# Patient Record
Sex: FEMALE | Race: WHITE | Employment: OTHER | URBAN - METROPOLITAN AREA
[De-identification: names, ages, dates, MRNs, and addresses within clinical notes are randomized per-mention and may not be internally consistent; named-entity substitution may affect disease eponyms.]

---

## 2017-05-24 DIAGNOSIS — Z98.84 STATUS POST GASTRIC BANDING: ICD-10-CM

## 2017-05-24 DIAGNOSIS — Z98.84 STATUS POST GASTRIC BYPASS FOR OBESITY: ICD-10-CM

## 2017-05-24 DIAGNOSIS — K90.89 OTHER SPECIFIED INTESTINAL MALABSORPTION: Primary | ICD-10-CM

## 2017-06-28 ENCOUNTER — OFFICE VISIT (OUTPATIENT)
Dept: SURGERY | Age: 67
End: 2017-06-28

## 2017-06-28 ENCOUNTER — HOSPITAL ENCOUNTER (OUTPATIENT)
Dept: LAB | Age: 67
Discharge: HOME | End: 2017-06-28
Payer: MEDICARE

## 2017-06-28 VITALS
BODY MASS INDEX: 32.89 KG/M2 | RESPIRATION RATE: 16 BRPM | HEART RATE: 78 BPM | OXYGEN SATURATION: 100 % | HEIGHT: 68 IN | WEIGHT: 217 LBS | SYSTOLIC BLOOD PRESSURE: 95 MMHG | DIASTOLIC BLOOD PRESSURE: 62 MMHG

## 2017-06-28 DIAGNOSIS — K90.89 OTHER SPECIFIED INTESTINAL MALABSORPTION: Primary | ICD-10-CM

## 2017-06-28 DIAGNOSIS — Z98.84 STATUS POST GASTRIC BYPASS FOR OBESITY: ICD-10-CM

## 2017-06-28 DIAGNOSIS — K90.89 OTHER SPECIFIED INTESTINAL MALABSORPTION: ICD-10-CM

## 2017-06-28 DIAGNOSIS — Z98.84 STATUS POST GASTRIC BANDING: ICD-10-CM

## 2017-06-28 LAB
ALBUMIN SERPL BCP-MCNC: 3.4 G/DL (ref 3.4–5)
ALBUMIN/GLOB SERPL: 1.2 {RATIO} (ref 0.8–1.7)
ALP SERPL-CCNC: 63 U/L (ref 45–117)
ALT SERPL-CCNC: 13 U/L (ref 13–56)
ANION GAP BLD CALC-SCNC: 9 MMOL/L (ref 3–18)
AST SERPL W P-5'-P-CCNC: 13 U/L (ref 15–37)
BASOPHILS # BLD AUTO: 0.1 K/UL (ref 0–0.06)
BASOPHILS # BLD: 1 % (ref 0–2)
BILIRUB SERPL-MCNC: 0.4 MG/DL (ref 0.2–1)
BUN SERPL-MCNC: 25 MG/DL (ref 7–18)
BUN/CREAT SERPL: 18 (ref 12–20)
CALCIUM SERPL-MCNC: 9.4 MG/DL (ref 8.5–10.1)
CHLORIDE SERPL-SCNC: 106 MMOL/L (ref 100–108)
CO2 SERPL-SCNC: 29 MMOL/L (ref 21–32)
CREAT SERPL-MCNC: 1.4 MG/DL (ref 0.6–1.3)
DIFFERENTIAL METHOD BLD: ABNORMAL
EOSINOPHIL # BLD: 0.4 K/UL (ref 0–0.4)
EOSINOPHIL NFR BLD: 6 % (ref 0–5)
ERYTHROCYTE [DISTWIDTH] IN BLOOD BY AUTOMATED COUNT: 13.5 % (ref 11.6–14.5)
FERRITIN SERPL-MCNC: 305 NG/ML (ref 8–388)
FOLATE SERPL-MCNC: >20 NG/ML (ref 3.1–17.5)
GLOBULIN SER CALC-MCNC: 2.8 G/DL (ref 2–4)
GLUCOSE SERPL-MCNC: 107 MG/DL (ref 74–99)
HCT VFR BLD AUTO: 34.6 % (ref 35–45)
HGB BLD-MCNC: 11.5 G/DL (ref 12–16)
IRON SERPL-MCNC: 107 UG/DL (ref 50–175)
LYMPHOCYTES # BLD AUTO: 27 % (ref 21–52)
LYMPHOCYTES # BLD: 1.8 K/UL (ref 0.9–3.6)
MCH RBC QN AUTO: 31.2 PG (ref 24–34)
MCHC RBC AUTO-ENTMCNC: 33.2 G/DL (ref 31–37)
MCV RBC AUTO: 93.8 FL (ref 74–97)
MONOCYTES # BLD: 0.5 K/UL (ref 0.05–1.2)
MONOCYTES NFR BLD AUTO: 8 % (ref 3–10)
NEUTS SEG # BLD: 3.7 K/UL (ref 1.8–8)
NEUTS SEG NFR BLD AUTO: 58 % (ref 40–73)
PLATELET # BLD AUTO: 255 K/UL (ref 135–420)
PMV BLD AUTO: 10.5 FL (ref 9.2–11.8)
POTASSIUM SERPL-SCNC: 3.8 MMOL/L (ref 3.5–5.5)
PROT SERPL-MCNC: 6.2 G/DL (ref 6.4–8.2)
RBC # BLD AUTO: 3.69 M/UL (ref 4.2–5.3)
SODIUM SERPL-SCNC: 144 MMOL/L (ref 136–145)
VIT B12 SERPL-MCNC: 375 PG/ML (ref 211–911)
WBC # BLD AUTO: 6.4 K/UL (ref 4.6–13.2)

## 2017-06-28 PROCEDURE — 82746 ASSAY OF FOLIC ACID SERUM: CPT | Performed by: SPECIALIST

## 2017-06-28 PROCEDURE — 85025 COMPLETE CBC W/AUTO DIFF WBC: CPT | Performed by: SPECIALIST

## 2017-06-28 PROCEDURE — 82607 VITAMIN B-12: CPT | Performed by: SPECIALIST

## 2017-06-28 PROCEDURE — 83540 ASSAY OF IRON: CPT | Performed by: SPECIALIST

## 2017-06-28 PROCEDURE — 80053 COMPREHEN METABOLIC PANEL: CPT | Performed by: SPECIALIST

## 2017-06-28 PROCEDURE — 36415 COLL VENOUS BLD VENIPUNCTURE: CPT | Performed by: SPECIALIST

## 2017-06-28 PROCEDURE — 84425 ASSAY OF VITAMIN B-1: CPT | Performed by: SPECIALIST

## 2017-06-28 PROCEDURE — 82728 ASSAY OF FERRITIN: CPT | Performed by: SPECIALIST

## 2017-06-28 NOTE — PATIENT INSTRUCTIONS
Body Mass Index: Care Instructions  Your Care Instructions    Body mass index (BMI) can help you see if your weight is raising your risk for health problems. It uses a formula to compare how much you weigh with how tall you are. · A BMI lower than 18.5 is considered underweight. · A BMI between 18.5 and 24.9 is considered healthy. · A BMI between 25 and 29.9 is considered overweight. A BMI of 30 or higher is considered obese. If your BMI is in the normal range, it means that you have a lower risk for weight-related health problems. If your BMI is in the overweight or obese range, you may be at increased risk for weight-related health problems, such as high blood pressure, heart disease, stroke, arthritis or joint pain, and diabetes. If your BMI is in the underweight range, you may be at increased risk for health problems such as fatigue, lower protection (immunity) against illness, muscle loss, bone loss, hair loss, and hormone problems. BMI is just one measure of your risk for weight-related health problems. You may be at higher risk for health problems if you are not active, you eat an unhealthy diet, or you drink too much alcohol or use tobacco products. Follow-up care is a key part of your treatment and safety. Be sure to make and go to all appointments, and call your doctor if you are having problems. It's also a good idea to know your test results and keep a list of the medicines you take. How can you care for yourself at home? · Practice healthy eating habits. This includes eating plenty of fruits, vegetables, whole grains, lean protein, and low-fat dairy. · If your doctor recommends it, get more exercise. Walking is a good choice. Bit by bit, increase the amount you walk every day. Try for at least 30 minutes on most days of the week. · Do not smoke. Smoking can increase your risk for health problems. If you need help quitting, talk to your doctor about stop-smoking programs and medicines. These can increase your chances of quitting for good. · Limit alcohol to 2 drinks a day for men and 1 drink a day for women. Too much alcohol can cause health problems. If you have a BMI higher than 25  · Your doctor may do other tests to check your risk for weight-related health problems. This may include measuring the distance around your waist. A waist measurement of more than 40 inches in men or 35 inches in women can increase the risk of weight-related health problems. · Talk with your doctor about steps you can take to stay healthy or improve your health. You may need to make lifestyle changes to lose weight and stay healthy, such as changing your diet and getting regular exercise. If you have a BMI lower than 18.5  · Your doctor may do other tests to check your risk for health problems. · Talk with your doctor about steps you can take to stay healthy or improve your health. You may need to make lifestyle changes to gain or maintain weight and stay healthy, such as getting more healthy foods in your diet and doing exercises to build muscle. Where can you learn more? Go to http://pamela-camden.info/. Enter S176 in the search box to learn more about \"Body Mass Index: Care Instructions. \"  Current as of: January 23, 2017  Content Version: 11.3  © 7730-9320 [employer]. Care instructions adapted under license by [employer] (which disclaims liability or warranty for this information). If you have questions about a medical condition or this instruction, always ask your healthcare professional. [place] [address] any warranty or liability for your use of this information. Patient Instructions      1. Continue to monitor carbohydrate and protein intake- remember to keep your           total  carbohydrates to 50 grams or less per day for best results.   2. Remember hydration goals - usually 48 to 64 ounces of liquids per day  3. Continue to work towards exercise goals - minimum 3 days per week of 45          minutes to  1 hour at a time. 4. Remember to take vitamins as directed        Supplement Resource Guide    Importance of Protein:   Maintains lean body mass, produces antibodies to fight off infections, heals wounds, minimizes hair loss, helps to give you energy, helps with satiety, and keeping you full between meals. Importance of Calcium:  Needed for healthy bones and teeth, normal blood clotting, and nervous system functioning, higher risk of osteoporosis and bone disease with non-compliance. Importance of Multivitamins: Many functions. Supply you with extra nutrients that you may be missing from food. May lead to iron deficiency anemia, weakness, fatigue, and many other symptoms with non-compliance. Importance of B Vitamins:  Important for red blood cell formation, metabolism, energy, and helps to maintain a healthy nervous system. Protein Supplement  Find one you like now. Use immediately after surgery. Look for:  35-50g protein each day from your protein supplement once you reach the progression diet. 0-3 g fat per serving  0-3 g sugar per serving    Protein drinks should be split in separate dosages. Recommend: Lifelong  1 year + Calcium Supplement:     Start taking within a month after surgery. Look for: Calcium Citrate Plus D (1500 mg per day)  Recommend: Citracal     .          Avoid chocolate chewable calcium. Can use chewable bariatric or [employer] brand or similar chewable. The body cannot absorb more than 500-600 mg @ a time. Take for Life Multi-vitamin Supplement:      1st Month After Surgery: Any complete chewable, such as: [place]s Complete chewables. Avoid [place] sours or gummies. They lack iron and other important nutrients and also have added sugar.     Continue with chewable vitamin or change to adult complete multivitamin one month after surgery. Menstruating women can take a prenatal vitamin. Make sure has at least 18 mg iron and 290-090 mcg folic acid):    Vitamin B12, B Complex Vitamin, and Biotin  Start taking within a month after surgery. Vitamin B12:  1000 mcg of Vitamin B12 three times weekly    Must take sublingually (meaning you take it under your tongue) or in a liquid drop form for easy absorption. B Complex Vitamin: Take a pill or liquid drop form once daily. Biotin: This vitamin can help prevent hair loss.     Recommend 5mg   (5000 mcg) a day  Biotin is Optional

## 2017-06-30 LAB — VIT B1 BLD-SCNC: 119.2 NMOL/L (ref 66.5–200)

## 2017-08-23 ENCOUNTER — APPOINTMENT (OUTPATIENT)
Dept: GENERAL RADIOLOGY | Age: 67
End: 2017-08-23
Attending: SPECIALIST
Payer: MEDICARE

## 2017-08-23 ENCOUNTER — HOSPITAL ENCOUNTER (OUTPATIENT)
Age: 67
Setting detail: OUTPATIENT SURGERY
Discharge: HOME | End: 2017-08-23
Attending: SPECIALIST | Admitting: SPECIALIST
Payer: MEDICARE

## 2017-08-23 VITALS
SYSTOLIC BLOOD PRESSURE: 144 MMHG | RESPIRATION RATE: 16 BRPM | WEIGHT: 207.3 LBS | HEIGHT: 68 IN | DIASTOLIC BLOOD PRESSURE: 88 MMHG | TEMPERATURE: 95.6 F | OXYGEN SATURATION: 99 % | BODY MASS INDEX: 31.42 KG/M2 | HEART RATE: 102 BPM

## 2017-08-23 DIAGNOSIS — Z46.51 FITTING AND ADJUSTMENT OF GASTRIC LAP BAND: ICD-10-CM

## 2017-08-23 PROCEDURE — 74011000250 HC RX REV CODE- 250: Performed by: SPECIALIST

## 2017-08-23 PROCEDURE — 76000 FLUOROSCOPY <1 HR PHYS/QHP: CPT

## 2017-08-23 PROCEDURE — 74011000255 HC RX REV CODE- 255: Performed by: SPECIALIST

## 2017-08-23 PROCEDURE — 43999 UNLISTED PROCEDURE STOMACH: CPT | Performed by: SPECIALIST

## 2017-08-23 RX ORDER — LIDOCAINE HYDROCHLORIDE 10 MG/ML
INJECTION INFILTRATION; PERINEURAL AS NEEDED
Status: DISCONTINUED | OUTPATIENT
Start: 2017-08-23 | End: 2017-08-23 | Stop reason: HOSPADM

## 2017-08-23 NOTE — DISCHARGE INSTRUCTIONS
Raza Jarvis  [id number] [address] [facility] [address] [facility], [address] [place] [address] - [phone] Cristóbal Ramos, Registered Dietician;  [phone]    To schedule your next adjustment, call Ivon Ham @ [phone]    Post Adjustable Gastric Band Fill Instructions    Your band may now be fairly tight and some swelling may occur at the band site following a fill. Therefore, you should:   Stay on liquids for 2 days   Then on soft foods for 2 days   Then resume regular foods    All meals should fit into a 4 oz. (1/2 cup) container. Eating Tips:   Use a small plate   Put your fork down between bites   Eat slowly   Do not eat and drink at the same time. Tips for Weight Loss:   Exercise and protein will  help increase and maintain and build muscle mass   Exercise at least 30-40 min. each day. Include cardiovascular and resistance training.  Drink at least 8 glasses of water every day.  Take you multi-vitamins and B vitamins every day.  Journal your food   Keep carbohydrates less than 50Gm per day. Call for a band adjustment if:   You are losing less than 1 lb per week   You are having increasing hunger between meals    Call for evaluation if you develop reflux or inability to tolerate solid foods. Your band may be too tight. Make sure you have a follow up appointment.         Support Group Meetings  [facility]  2nd Thursday every month  6:00pm

## 2017-08-23 NOTE — IP AVS SNAPSHOT
Marcela Ramirez 
 
 
 [address] [place] [id number] 
[phone] Patient: John Epperson MRN: [id number] JOHN:[birth year] You are allergic to the following Allergen Reactions Ciprofibrate Anaphylaxis Doxycycline Anaphylaxis Flagyl (Metronidazole) Anaphylaxis Levaquin (Levofloxacin) Anaphylaxis Sulfa (Sulfonamide Antibiotics) Anaphylaxis Rash Codeine Nausea and Vomiting Pcn (Penicillins) Unknown (comments) Pt denies this allergy Versed (Midazolam) Nausea and Vomiting Recent Documentation Height Weight BMI OB Status Smoking Status 1.727 m 94 kg 31.52 kg/m2 Hysterectomy Former Smoker Emergency Contacts Name Discharge Info Relation Home Work Mobile Leonora Welsh  Spouse [3] [phone] Robert Ingram [3] [phone] About your hospitalization You were admitted on:  August 23, 2017 You last received care in the:  [facility] ENDOSCOPY You were discharged on:  August 23, 2017 Unit phone number:  [phone] Why you were hospitalized Your primary diagnosis was:  Not on File Providers Seen During Your Hospitalizations Provider Role Specialty Primary office phone Mariya Marin MD Attending Provider General Surgery [phone] Your Primary Care Physician (PCP) Primary Care Physician Office Phone Office Fax Bulmaro Hughes [phone] Follow-up Information None Current Discharge Medication List  
  
ASK your doctor about these medications Dose & Instructions Dispensing Information Comments Morning Noon Evening Bedtime AMBIEN 10 mg tablet Generic drug:  zolpidem Your last dose was: Your next dose is:    
   
   
 Dose:  10 mg Take 10 mg by mouth nightly. Refills:  0  
     
   
   
   
  
 ATIVAN 0.5 mg tablet Generic drug:  LORazepam  
   
Your last dose was: Your next dose is:    
   
   
 Dose:  0.5 mg Take 0.5 mg by mouth every four (4) hours as needed. Refills:  0  
     
   
   
   
  
 busPIRone 10 mg tablet Commonly known as:  BUSPAR Your last dose was: Your next dose is:    
   
   
 Dose:  5 mg Take 5 mg by mouth daily. Refills:  0  
     
   
   
   
  
 cholecalciferol (VITAMIN D3) 5,000 unit Tab tablet Commonly known as:  VITAMIN D3 Your last dose was: Your next dose is:    
   
   
 Dose:  5000 Units Take 5,000 Units by mouth nightly. Refills:  0 COREG 12.5 mg tablet Generic drug:  carvedilol Your last dose was: Your next dose is:    
   
   
 Dose:  12.5 mg Take 12.5 mg by mouth two (2) times daily (with meals). Refills:  0  
     
   
   
   
  
 cranberry extract 450 mg Tab tablet Your last dose was: Your next dose is: Take  by mouth. Refills:  0  
     
   
   
   
  
 CYMBALTA 60 mg capsule Generic drug:  DULoxetine Your last dose was: Your next dose is:    
   
   
 Dose:  60 mg Take 60 mg by mouth daily. Refills:  0  
     
   
   
   
  
 ferrous sulfate 325 mg (65 mg iron) tablet Your last dose was: Your next dose is:    
   
   
 Dose:  325 mg Take 325 mg by mouth Daily (before breakfast). Refills:  0 KRILL OIL PO Your last dose was: Your next dose is:    
   
   
 Dose:  25 mg Take 25 mg by mouth daily. Refills:  0  
     
   
   
   
  
 LASIX 40 mg tablet Generic drug:  furosemide Your last dose was: Your next dose is:    
   
   
 Dose:  40 mg Take 40 mg by mouth daily. Refills:  0  
     
   
   
   
  
 metFORMIN 500 mg tablet Commonly known as:  GLUCOPHAGE Your last dose was: Your next dose is: Take  by mouth two (2) times daily (with meals). Refills:  0  
     
   
   
   
  
 multivitamin tablet Commonly known as:  ONE A DAY Your last dose was: Your next dose is:    
   
   
 Dose:  1 Tab Take 1 tablet by mouth daily. Refills:  0 PROBIOTIC 4X 10-15 mg Tbec Generic drug:  B.infantis-B.ani-B.long-B.bifi Your last dose was: Your next dose is: Take  by mouth. Refills:  0  
     
   
   
   
  
 simvastatin 20 mg tablet Commonly known as:  ZOCOR Your last dose was: Your next dose is:    
   
   
 Dose:  20 mg Take 20 mg by mouth daily. Refills:  0  
     
   
   
   
  
 SYNTHROID 100 mcg tablet Generic drug:  levothyroxine Your last dose was: Your next dose is:    
   
   
 Dose:  100 mcg Take 100 mcg by mouth nightly. Refills:  0  
     
   
   
   
  
 VITAMIN C 1,000 mg tablet Generic drug:  ascorbic acid (vitamin C) Your last dose was: Your next dose is:    
   
   
 Dose:  1000 mg Take 1,000 mg by mouth two (2) times a day. Refills:  0  
     
   
   
   
  
 VITAMIN E PO Your last dose was: Your next dose is:    
   
   
 Dose:  500 Units Take 500 Units by mouth daily. Refills:  0 Discharge Instructions [address] [facility] [address] Shyam Blandon [facility]. [facility] [place] [place] [address], [address] [place] [address] Street [address] 
[phone] Malena Leone, Registered Dietician;  [phone] To schedule your next adjustment, call Jennifer Robertson @ [phone] Post Adjustable Gastric Band Fill Instructions Your band may now be fairly tight and some swelling may occur at the band site following a fill. Therefore, you should: 
? Stay on liquids for 2 days ? Then on soft foods for 2 days ? Then resume regular foods All meals should fit into a 4 oz. (1/2 cup) container. Eating Tips: 
? Use a small plate ? Put your fork down between bites ? Eat slowly ? Do not eat and drink at the same time. Tips for Weight Loss: 
? Exercise and protein will  help increase and maintain and build muscle mass ? Exercise at least 30-40 min. each day. Include cardiovascular and resistance training. ? Drink at least 8 glasses of water every day. ? Take you multi-vitamins and B vitamins every day. ? Journal your food ? Keep carbohydrates less than 50Gm per day. Call for a band adjustment if: 
? You are losing less than 1 lb per week ? You are having increasing hunger between meals Call for evaluation if you develop reflux or inability to tolerate solid foods. Your band may be too tight. Make sure you have a follow up appointment. Support Group Meetings [facility] 2nd Thursday every month 
6:00pm 
 
Discharge Orders None Introducing [facility] SERVICES! Dear Amanda Reich: Thank you for requesting a [employer] account. Our records indicate that you already have an active [employer] account. You can access your account anytime at [email or web address] [email or web address]. [employer]/Drop Messages Did you know that you can access your hospital and ER discharge instructions at any time in [employer]? You can also review all of your test results from your hospital stay or ER visit. Additional Information If you have questions, please visit the Frequently Asked Questions section of the [employer] website at [email or web address] [employer]/Drop Mess[employer]/. Remember, [employer] is NOT to be used for urgent needs. For medical emergencies, dial 911. Now available from your iPhone and Android! General Information Please provide this summary of care documentation to your next provider. Patient Signature:  ____________________________________________________________  Date:  ____________________________________________________________  
  
Parish Cedillo    
    
 Provider Signature:  ____________________________________________________________ Date:  ____________________________________________________________

## 2017-08-23 NOTE — PROCEDURES
Lap Band Encounter (fluroscopy clinic)    Kan Camacho is gastric banding patient who had her procedure on 01/02/10.  her weight today is 94 kg (207 lb 4.8 oz), which correlates to  % EBW loss. she is here today for Lap Band Adjustment / Fill with Fluoroscopy Guidance. she notes the following issues related to the banding procedure; - here for routine adjustment.       Surgery related complications; Brengman band over cluck VBG    Visit Vitals    /88    Pulse (!) 102    Temp 95.6 °F (35.3 °C)    Resp 16    Ht 5' 8\" (1.727 m)    Wt 94 kg (207 lb 4.8 oz)    SpO2 99%    BMI 31.52 kg/m2       Past Medical History:   Diagnosis Date    Asthma     denies    Bronchitis     Chronic pain     arthritis & previous surgeries    Depression     Diverticulitis     History of blood transfusion     x2    Hypertension     Hypothyroid     Intestinal malabsorption     Morbid obesity ([address]     Osteoporosis     Other ill-defined conditions     bronchitis 2 weeks ago    Other ill-defined conditions     collapsed pelvis on left side    Pelvic obliquity     Smoking history     quit in 1969    Status post gastric banding 03/2010    band over prior gastric bypass / Brian Vallejo    Status post gastric bypass for obesity 2003    open VGB / lencho quiñones     Past Surgical History:   Procedure Laterality Date    ABDOMEN SURGERY PROC UNLISTED      BREAST SURGERY PROCEDURE UNLISTED      3 partial lumpectomies, left and right    COLONOSCOPY N/A 12/16/2016    COLONOSCOPY w/ polypectomies performed by Matthew Ugarte MD at [facility] ENDOSCOPY    HX GASTRIC BYPASS  2003    HX GASTRIC BYPASS  2009    lap band    HX HEENT      tonsillectomy and adenoidectomy    HX HYSTERECTOMY      HX KNEE REPLACEMENT      bilateral knee    HX ORTHOPAEDIC      left hip replacement x 2,    HX SHOULDER REPLACEMENT      left    HX TONSIL AND ADENOIDECTOMY       Current Facility-Administered Medications   Medication Dose Route Frequency Provider Last Rate Last Dose    barium sulfate (EZ PAQUE) 96% (w/w) contrast suspension    PRN Sid Verduzco MD   30 mL at 08/23/17 1351    lidocaine (XYLOCAINE) 10 mg/mL (1 %) injection    PRN Sid Verduzco MD   1.5 mL at 08/23/17 1351          Review of Symptoms:     General - No history or complaints of unexpected fever or chills  Cardiac - No history or complaints of chest pain, palpitations, or shortness of breath  Pulmonary - No history or complaints of shortness of breath or productive cough  Gastrointestinal - as noted above        Physical Exam:    General:  alert, cooperative, no distress, appears stated age   Abdomen:   abdomen is soft without significant tenderness, masses, organomegaly or guarding; port in place   Incisions: healing well, no significant drainage       Assessment:     1. History of Morbid obesity, status post gastric banding, given the fluro findings we will proceed with the following adjustment. Plan:     Previous Fill Volume:     Removed:       Total fill volume after today's adjustment:    Added:           pt came to use with unknown amount of fluid in band - at this point she has had 1.25 cc added via this office over 2 separate adjustments       Follow-up in PRN

## 2017-08-23 NOTE — IP AVS SNAPSHOT
Trinity Freeman 
 
 
 [address] [facility] [address] 
[phone] Patient: Alma Mccarthy MRN: [id number] XSZ:[birth year] Current Discharge Medication List  
  
ASK your doctor about these medications Dose & Instructions Dispensing Information Comments Morning Noon Evening Bedtime AMBIEN 10 mg tablet Generic drug:  zolpidem Your last dose was: Your next dose is:    
   
   
 Dose:  10 mg Take 10 mg by mouth nightly. Refills:  0  
     
   
   
   
  
 ATIVAN 0.5 mg tablet Generic drug:  LORazepam  
   
Your last dose was: Your next dose is:    
   
   
 Dose:  0.5 mg Take 0.5 mg by mouth every four (4) hours as needed. Refills:  0  
     
   
   
   
  
 busPIRone 10 mg tablet Commonly known as:  BUSPAR Your last dose was: Your next dose is:    
   
   
 Dose:  5 mg Take 5 mg by mouth daily. Refills:  0  
     
   
   
   
  
 cholecalciferol (VITAMIN D3) 5,000 unit Tab tablet Commonly known as:  VITAMIN D3 Your last dose was: Your next dose is:    
   
   
 Dose:  5000 Units Take 5,000 Units by mouth nightly. Refills:  0 COREG 12.5 mg tablet Generic drug:  carvedilol Your last dose was: Your next dose is:    
   
   
 Dose:  12.5 mg Take 12.5 mg by mouth two (2) times daily (with meals). Refills:  0  
     
   
   
   
  
 cranberry extract 450 mg Tab tablet Your last dose was: Your next dose is: Take  by mouth. Refills:  0  
     
   
   
   
  
 CYMBALTA 60 mg capsule Generic drug:  DULoxetine Your last dose was: Your next dose is:    
   
   
 Dose:  60 mg Take 60 mg by mouth daily. Refills:  0  
     
   
   
   
  
 ferrous sulfate 325 mg (65 mg iron) tablet Your last dose was:     
   
Your next dose is:    
   
   
 Dose:  325 mg  
 Take 325 mg by mouth Daily (before breakfast). Refills:  0 KRILL OIL PO Your last dose was: Your next dose is:    
   
   
 Dose:  25 mg Take 25 mg by mouth daily. Refills:  0  
     
   
   
   
  
 LASIX 40 mg tablet Generic drug:  furosemide Your last dose was: Your next dose is:    
   
   
 Dose:  40 mg Take 40 mg by mouth daily. Refills:  0  
     
   
   
   
  
 metFORMIN 500 mg tablet Commonly known as:  GLUCOPHAGE Your last dose was: Your next dose is: Take  by mouth two (2) times daily (with meals). Refills:  0  
     
   
   
   
  
 multivitamin tablet Commonly known as:  ONE A DAY Your last dose was: Your next dose is:    
   
   
 Dose:  1 Tab Take 1 tablet by mouth daily. Refills:  0 PROBIOTIC 4X 10-15 mg Tbec Generic drug:  B.infantis-B.ani-B.long-B.bifi Your last dose was: Your next dose is: Take  by mouth. Refills:  0  
     
   
   
   
  
 simvastatin 20 mg tablet Commonly known as:  ZOCOR Your last dose was: Your next dose is:    
   
   
 Dose:  20 mg Take 20 mg by mouth daily. Refills:  0  
     
   
   
   
  
 SYNTHROID 100 mcg tablet Generic drug:  levothyroxine Your last dose was: Your next dose is:    
   
   
 Dose:  100 mcg Take 100 mcg by mouth nightly. Refills:  0  
     
   
   
   
  
 VITAMIN C 1,000 mg tablet Generic drug:  ascorbic acid (vitamin C) Your last dose was: Your next dose is:    
   
   
 Dose:  1000 mg Take 1,000 mg by mouth two (2) times a day. Refills:  0  
     
   
   
   
  
 VITAMIN E PO Your last dose was: Your next dose is:    
   
   
 Dose:  500 Units Take 500 Units by mouth daily. Refills:  0

## 2018-03-20 ENCOUNTER — ANESTHESIA EVENT (OUTPATIENT)
Dept: SURGERY | Age: 68
End: 2018-03-20
Payer: MEDICARE

## 2018-03-21 ENCOUNTER — HOSPITAL ENCOUNTER (OUTPATIENT)
Age: 68
Setting detail: OUTPATIENT SURGERY
Discharge: HOME | End: 2018-03-21
Attending: OPHTHALMOLOGY | Admitting: OPHTHALMOLOGY
Payer: MEDICARE

## 2018-03-21 ENCOUNTER — ANESTHESIA (OUTPATIENT)
Dept: SURGERY | Age: 68
End: 2018-03-21
Payer: MEDICARE

## 2018-03-21 VITALS
SYSTOLIC BLOOD PRESSURE: 123 MMHG | DIASTOLIC BLOOD PRESSURE: 67 MMHG | WEIGHT: 186.19 LBS | RESPIRATION RATE: 16 BRPM | BODY MASS INDEX: 28.22 KG/M2 | TEMPERATURE: 97.3 F | HEART RATE: 67 BPM | HEIGHT: 68 IN | OXYGEN SATURATION: 96 %

## 2018-03-21 PROBLEM — H26.9 CATARACT: Status: ACTIVE | Noted: 2018-03-21

## 2018-03-21 PROCEDURE — 76010000138 HC OR TIME 0.5 TO 1 HR: Performed by: OPHTHALMOLOGY

## 2018-03-21 PROCEDURE — V2632 POST CHMBR INTRAOCULAR LENS: HCPCS | Performed by: OPHTHALMOLOGY

## 2018-03-21 PROCEDURE — 77030018846 HC SOL IRR STRL H20 ICUM -A: Performed by: OPHTHALMOLOGY

## 2018-03-21 PROCEDURE — 74011250636 HC RX REV CODE- 250/636

## 2018-03-21 PROCEDURE — 76210000020 HC REC RM PH II FIRST 0.5 HR: Performed by: OPHTHALMOLOGY

## 2018-03-21 PROCEDURE — 74011250636 HC RX REV CODE- 250/636: Performed by: OPHTHALMOLOGY

## 2018-03-21 PROCEDURE — 74011000250 HC RX REV CODE- 250

## 2018-03-21 PROCEDURE — 74011000250 HC RX REV CODE- 250: Performed by: OPHTHALMOLOGY

## 2018-03-21 PROCEDURE — 77030018606 HC TIP PHACO4 J&J -B: Performed by: OPHTHALMOLOGY

## 2018-03-21 PROCEDURE — 77030006704 HC BLD OPHTH SLT ALCN -B: Performed by: OPHTHALMOLOGY

## 2018-03-21 PROCEDURE — 76060000032 HC ANESTHESIA 0.5 TO 1 HR: Performed by: OPHTHALMOLOGY

## 2018-03-21 DEVICE — LENS IO +190 DIOPT L13MM DIA6MM 0DEG HAPTIC ANG A CONSTANT: Type: IMPLANTABLE DEVICE | Site: EYE | Status: FUNCTIONAL

## 2018-03-21 RX ORDER — PROPOFOL 10 MG/ML
INJECTION, EMULSION INTRAVENOUS AS NEEDED
Status: DISCONTINUED | OUTPATIENT
Start: 2018-03-21 | End: 2018-03-21 | Stop reason: HOSPADM

## 2018-03-21 RX ORDER — PHENYLEPHRINE HYDROCHLORIDE 25 MG/ML
1 SOLUTION/ DROPS OPHTHALMIC
Status: COMPLETED | OUTPATIENT
Start: 2018-03-21 | End: 2018-03-21

## 2018-03-21 RX ORDER — SODIUM CHLORIDE, SODIUM LACTATE, POTASSIUM CHLORIDE, CALCIUM CHLORIDE 600; 310; 30; 20 MG/100ML; MG/100ML; MG/100ML; MG/100ML
75 INJECTION, SOLUTION INTRAVENOUS CONTINUOUS
Status: DISCONTINUED | OUTPATIENT
Start: 2018-03-21 | End: 2018-03-21 | Stop reason: HOSPADM

## 2018-03-21 RX ORDER — TOBRAMYCIN 3 MG/ML
1 SOLUTION/ DROPS OPHTHALMIC
Status: DISCONTINUED | OUTPATIENT
Start: 2018-03-21 | End: 2018-03-21 | Stop reason: HOSPADM

## 2018-03-21 RX ORDER — FENTANYL CITRATE 50 UG/ML
INJECTION, SOLUTION INTRAMUSCULAR; INTRAVENOUS AS NEEDED
Status: DISCONTINUED | OUTPATIENT
Start: 2018-03-21 | End: 2018-03-21 | Stop reason: HOSPADM

## 2018-03-21 RX ORDER — TROPICAMIDE 10 MG/ML
1 SOLUTION/ DROPS OPHTHALMIC
Status: COMPLETED | OUTPATIENT
Start: 2018-03-21 | End: 2018-03-21

## 2018-03-21 RX ORDER — EPINEPHRINE 1 MG/ML
INJECTION, SOLUTION, CONCENTRATE INTRAVENOUS AS NEEDED
Status: DISCONTINUED | OUTPATIENT
Start: 2018-03-21 | End: 2018-03-21 | Stop reason: HOSPADM

## 2018-03-21 RX ORDER — ONDANSETRON 2 MG/ML
INJECTION INTRAMUSCULAR; INTRAVENOUS AS NEEDED
Status: DISCONTINUED | OUTPATIENT
Start: 2018-03-21 | End: 2018-03-21 | Stop reason: HOSPADM

## 2018-03-21 RX ORDER — PROMETHAZINE HYDROCHLORIDE 25 MG/1
25 TABLET ORAL
COMMUNITY

## 2018-03-21 RX ORDER — LIDOCAINE HYDROCHLORIDE 10 MG/ML
INJECTION, SOLUTION EPIDURAL; INFILTRATION; INTRACAUDAL; PERINEURAL AS NEEDED
Status: DISCONTINUED | OUTPATIENT
Start: 2018-03-21 | End: 2018-03-21 | Stop reason: HOSPADM

## 2018-03-21 RX ADMIN — PROPOFOL 20 MG: 10 INJECTION, EMULSION INTRAVENOUS at 07:47

## 2018-03-21 RX ADMIN — TROPICAMIDE 1 DROP: 10 SOLUTION/ DROPS OPHTHALMIC at 06:37

## 2018-03-21 RX ADMIN — FENTANYL CITRATE 25 MCG: 50 INJECTION, SOLUTION INTRAMUSCULAR; INTRAVENOUS at 07:45

## 2018-03-21 RX ADMIN — PHENYLEPHRINE HYDROCHLORIDE 1 DROP: 2.5 SOLUTION/ DROPS OPHTHALMIC at 06:44

## 2018-03-21 RX ADMIN — PROPOFOL 20 MG: 10 INJECTION, EMULSION INTRAVENOUS at 07:58

## 2018-03-21 RX ADMIN — TROPICAMIDE 1 DROP: 10 SOLUTION/ DROPS OPHTHALMIC at 06:49

## 2018-03-21 RX ADMIN — FENTANYL CITRATE 25 MCG: 50 INJECTION, SOLUTION INTRAMUSCULAR; INTRAVENOUS at 07:37

## 2018-03-21 RX ADMIN — PROPOFOL 20 MG: 10 INJECTION, EMULSION INTRAVENOUS at 07:54

## 2018-03-21 RX ADMIN — LIDOCAINE HYDROCHLORIDE 2 DROP: 35 GEL OPHTHALMIC at 07:31

## 2018-03-21 RX ADMIN — PHENYLEPHRINE HYDROCHLORIDE 1 DROP: 2.5 SOLUTION/ DROPS OPHTHALMIC at 06:31

## 2018-03-21 RX ADMIN — TROPICAMIDE 1 DROP: 10 SOLUTION/ DROPS OPHTHALMIC at 06:44

## 2018-03-21 RX ADMIN — TOBRAMYCIN 1 DROP: 3 SOLUTION/ DROPS OPHTHALMIC at 06:31

## 2018-03-21 RX ADMIN — SODIUM CHLORIDE, SODIUM LACTATE, POTASSIUM CHLORIDE, AND CALCIUM CHLORIDE 75 ML/HR: 600; 310; 30; 20 INJECTION, SOLUTION INTRAVENOUS at 06:46

## 2018-03-21 RX ADMIN — FENTANYL CITRATE 25 MCG: 50 INJECTION, SOLUTION INTRAMUSCULAR; INTRAVENOUS at 08:00

## 2018-03-21 RX ADMIN — PHENYLEPHRINE HYDROCHLORIDE 1 DROP: 2.5 SOLUTION/ DROPS OPHTHALMIC at 06:37

## 2018-03-21 RX ADMIN — PHENYLEPHRINE HYDROCHLORIDE 1 DROP: 2.5 SOLUTION/ DROPS OPHTHALMIC at 06:49

## 2018-03-21 RX ADMIN — PROPOFOL 10 MG: 10 INJECTION, EMULSION INTRAVENOUS at 08:01

## 2018-03-21 RX ADMIN — LIDOCAINE HYDROCHLORIDE 2 DROP: 35 GEL OPHTHALMIC at 06:50

## 2018-03-21 RX ADMIN — TROPICAMIDE 1 DROP: 10 SOLUTION/ DROPS OPHTHALMIC at 06:31

## 2018-03-21 RX ADMIN — ONDANSETRON 4 MG: 2 INJECTION INTRAMUSCULAR; INTRAVENOUS at 08:05

## 2018-03-21 RX ADMIN — PROPOFOL 20 MG: 10 INJECTION, EMULSION INTRAVENOUS at 07:50

## 2018-03-21 NOTE — PERIOP NOTES
Mccoy Lefort RN spoke with  and he is aware that pt is requesting to be put to sleep, and he stated that the procedure under MAC.  Spoke with  he is aware that pt is requesting specific medications, he stated her will talk to her on arrival .proceed with admission

## 2018-03-21 NOTE — ANESTHESIA POSTPROCEDURE EVALUATION
Post-Anesthesia Evaluation & Assessment    Visit Vitals    /67    Pulse 67    Temp 36.3 °C (97.3 °F)    Resp 16    Ht 5' 8\" (1.727 m)    Wt 84.5 kg (186 lb 3 oz)    SpO2 96%    BMI 28.31 kg/m2       No untreated/active PONV    Post-operative hydration adequate. Adequate post-operative analgesia per PACU discharge criteria    Mental status & level of consciousness: alert and oriented x 3    Respiratory status: patent unassisted airway     No apparent anesthetic complications requiring additional post-anesthetic care    Patient has met all discharge requirements.             Ziggy Ornelas MD

## 2018-03-21 NOTE — PERIOP NOTES
PATIENT SUPPLIED POST-OP DROPS: KETOROLAC, TOBRAMYCIN, PREDNISOLONE 1 GTT EACH left EYE AT END OF CASE. SUNGLASSES APPLIED AT END OF CASE.

## 2018-03-21 NOTE — PERIOP NOTES
Patient states that she will not be awake during surgery so she doesn't need to answer whether or not she is able to lie flat for at least 20 minutes without moving or coughing. Patient states that she will not have the procedure if she is not put to sleep.

## 2018-03-21 NOTE — IP AVS SNAPSHOT
[address] [address] 
[phone] Patient: Rashaad Fofana MRN: [id number] QES:2[id number] About your hospitalization You were admitted on:  March 21, 2018 You last received care in the:  [address] [place] [address] You were discharged on:  March 21, 2018 Why you were hospitalized Your primary diagnosis was:  Not on File Your diagnoses also included:  Cataract Follow-up Information Follow up With Details Comments Contact Info Miguel A Flaherty MD   [address] [id number] [phone] Discharge Orders None A check price indicates which time of day the medication should be taken. My Medications CONTINUE taking these medications Instructions Each Dose to Equal  
 Morning Noon Evening Bedtime AMBIEN 10 mg tablet Generic drug:  zolpidem Your last dose was: Your next dose is: Take 10 mg by mouth nightly. 10 mg  
    
   
   
   
  
 ATIVAN 0.5 mg tablet Generic drug:  LORazepam  
   
Your last dose was: Your next dose is: Take 0.5 mg by mouth every four (4) hours as needed. 0.5 mg  
    
   
   
   
  
 cholecalciferol (VITAMIN D3) 5,000 unit Tab tablet Commonly known as:  VITAMIN D3 Your last dose was: Your next dose is: Take 5,000 Units by mouth nightly. 5000 Units  
    
   
   
   
  
 cranberry extract 450 mg Tab tablet Your last dose was: Your next dose is: Take 450 mg by mouth daily. 450 mg  
    
   
   
   
  
 CYMBALTA 60 mg capsule Generic drug:  DULoxetine Your last dose was: Your next dose is: Take 60 mg by mouth daily. Indications: NEUROPATHIC PAIN  
 60 mg  
    
   
   
   
  
 ferrous sulfate 325 mg (65 mg iron) tablet Your last dose was: Your next dose is: Take 325 mg by mouth Daily (before breakfast). 325 mg KRILL OIL PO Your last dose was: Your next dose is: Take 75 mg by mouth daily. 75 mg  
    
   
   
   
  
 LASIX 40 mg tablet Generic drug:  furosemide Your last dose was: Your next dose is: Take 40 mg by mouth daily. 40 mg  
    
   
   
   
  
 multivitamin tablet Commonly known as:  ONE A DAY Your last dose was: Your next dose is: Take 1 tablet by mouth daily. 1 Tab PROBIOTIC 4X 10-15 mg Tbec Generic drug:  B.infantis-B.ani-B.long-B.bifi Your last dose was: Your next dose is: Take 3 Caps by mouth nightly. 3 Cap  
    
   
   
   
  
 promethazine 25 mg tablet Commonly known as:  PHENERGAN Your last dose was: Your next dose is: Take 25 mg by mouth daily as needed for Nausea. 25 mg  
    
   
   
   
  
 simvastatin 20 mg tablet Commonly known as:  ZOCOR Your last dose was: Your next dose is: Take 20 mg by mouth daily. 20 mg  
    
   
   
   
  
 SYNTHROID 100 mcg tablet Generic drug:  levothyroxine Your last dose was: Your next dose is: Take 100 mcg by mouth daily. 100 mcg VITAMIN E PO Your last dose was: Your next dose is: Take 400 Units by mouth daily. 400 Units Discharge Instructions DISCHARGE SUMMARY from Nurse PATIENT INSTRUCTIONS: 
 
 
F-face looks uneven A-arms unable to move or move unevenly S-speech slurred or non-existent T-time-call 911 as soon as signs and symptoms begin-DO NOT go Back to bed or wait to see if you get better-TIME IS BRAIN. Warning Signs of HEART ATTACK Call 911 if you have these symptoms: 
? Chest discomfort. Most heart attacks involve discomfort in the center of the chest that lasts more than a few minutes, or that goes away and comes back. It can feel like uncomfortable pressure, squeezing, fullness, or pain. ? Discomfort in other areas of the upper body. Symptoms can include pain or discomfort in one or both arms, the back, neck, jaw, or stomach. ? Shortness of breath with or without chest discomfort. ? Other signs may include breaking out in a cold sweat, nausea, or lightheadedness. Don't wait more than five minutes to call [address]! Fast action can save your life. Calling 911 is almost always the fastest way to get lifesaving treatment. Emergency Medical Services staff can begin treatment when they arrive  up to an hour sooner than if someone gets to the hospital by car. The discharge information has been reviewed with the patient and caregiver. The patient and caregiver verbalized understanding. Discharge medications reviewed with the patient and caregiver and appropriate educational materials and side effects teaching were provided. ___________________________________________________________________________________________________________________________________Post-Operative Cataract Instructions [facility] Chirag Lane M.D. Glendy Maynard M.D. 
[address] [facility] [address]. [address] Jaki Rodriguez 
[phone] Diet 1. Resume normal diet. 2. Do not drink alcoholic beverages, including beer for 24 hours. Activity 1. Do not drive a car or operate any hazardous machinery the day of surgery. 2. You may resume normal activities as tolerated. 3. No bending or heavy lifting. 4. No reading or computer work after your surgery. You may watch TV. Wound Care 1. Anticipate that your eye will tear and water. 2. You may also experience a sensation of a foreign body, sand, or grit in the surgical eye, this is normal. 
3. Do not touch the affected eye. 4. ** Do not remove eye shield unless directed to do so by your physician. ** 
5. Wear eye shield when resting or sleeping for one week. Medications 1. Take Tylenol Extra Strength two (2) tablets by mouth upon arrival home and then every four (4) hours as needed for discomfort. 2. Regarding Eye drops: 
-  Begin using your eye drops as directed by your physician in the (left) operative eye. One drop of     []   Zymar    [x]  Vigamox  
along with one (1) drop     []  Acular    [x]  Voltaren  
every four (4) hours while awake. Wait five (5) minutes then apply one (1) drop     []  Pred Forte    [x]  Econopred Plus  
every four (4) hours while awake. 3. If you take glaucoma medications, continue to do so unless the physician otherwise instructs you. 4. You may use artificial tears as needed if your eye feels scratchy. Notify your Physician Immediately for any of the followin. Excessive pain not relieved by Tylenol especially headache or increasing pressure to the operative eye. 2. Persistent nausea lasting more than eight hours. 3. If any vomiting occurs. If any questions or concerns arise, call your Surgeon at [phone]. Patient armband removed and shredded Introducing [facility] & [facility]! Dear Winslow Kanner: Thank you for requesting a [email or web address] account. Our records indicate that you already have an active [email or web address] account. You can access your account anytime at [email or web address] Did you know that you can access your hospital and ER discharge instructions at any time in [email or web address]? You can also review all of your test results from your hospital stay or ER visit. Additional Information If you have questions, please visit the Frequently Asked Questions section of the [employer] website at [email or web address] Remember, [employer] is NOT to be used for urgent needs. For medical emergencies, dial 911. Now available from your iPhone and Android! Providers Seen During Your Hospitalization Provider Specialty Primary office phone Manolo Tee MD Ophthalmology [phone] Your Primary Care Physician (PCP) Primary Care Physician Office Phone Office Fax Neliar Torres [phone] You are allergic to the following Allergen Reactions Ciprofibrate Anaphylaxis Levaquin (Levofloxacin) Anaphylaxis Sulfa (Sulfonamide Antibiotics) Anaphylaxis Rash Codeine Nausea and Vomiting Versed (Midazolam) Nausea and Vomiting Recent Documentation Height Weight BMI OB Status Smoking Status 1.727 m 84.5 kg 28.31 kg/m2 Hysterectomy Former Smoker Emergency Contacts Name Discharge Info Relation Home Work Mobile [address] CAREGIVER [3] Spouse [3]   [phone] Patient Belongings The following personal items are in your possession at time of discharge: 
  Dental Appliances: None  Visual Aid: None      Home Medications: Kept at bedside (bag of eye drops with pt )   Jewelry: None  Clothing: Footwear, Undergarments, Sweater, Sent home (Given to Molina )    Other Valuables: None Please provide this summary of care documentation to your next provider. Signatures-by signing, you are acknowledging that this After Visit Summary has been reviewed with you and you have received a copy. Patient Signature:  ____________________________________________________________ Date:  ____________________________________________________________  
  
Alicia Dewitt  Provider Signature: ____________________________________________________________ Date:  ____________________________________________________________

## 2018-03-21 NOTE — ANESTHESIA PREPROCEDURE EVALUATION
Anesthetic History   No history of anesthetic complications            Review of Systems / Medical History  Patient summary reviewed, nursing notes reviewed and pertinent labs reviewed    Pulmonary  Within defined limits                 Neuro/Psych             Comments: Anxiety Cardiovascular                  Exercise tolerance: >4 METS     GI/Hepatic/Renal  Within defined limits              Endo/Other      Hypothyroidism       Other Findings              Physical Exam    Airway  Mallampati: II  TM Distance: 4 - 6 cm  Neck ROM: normal range of motion   Mouth opening: Normal     Cardiovascular  Regular rate and rhythm,  S1 and S2 normal,  no murmur, click, rub, or gallop             Dental  No notable dental hx       Pulmonary  Breath sounds clear to auscultation               Abdominal  GI exam deferred       Other Findings            Anesthetic Plan    ASA: 2  Anesthesia type: MAC            Anesthetic plan and risks discussed with: Patient

## 2018-03-21 NOTE — OP NOTES
Cataract Operative Note      Patient: Zaina Leonardo               Sex: female          DOA: 3/21/2018         YOB: 1950      Age:  69 y.o.        LOS:  LOS: 0 days     Preoperative Diagnosis: Cataract left eye    Postoperative Diagnosis:  Cataract  left eye  Surgeon: Alexx Hernandez MD, M.D. Anesthesia:  Topical anesthesia  Procedure:  Phacoemulsification of posterior chamber for intraocular lens implantation left    Fluids:  0    Procedure in Detail: The operative eye was prepped and draped in the usual fashion. A lid speculum was placed in the operative eye. A clear cornea approach was utilized. A paracentesis incision(s) was constructed with a 1 mm slit knife. One percent preservative-free lidocaine followed by viscoelastic was instilled into the anterior chamber. A clear corneal incision was made with a slit knife. A continuous curvilinear capsulorrhexis was constructed followed by hydrodissection. A phacoemulsification tip was placed into the eye, and the lens nucleus was emulsified. The irrigation/aspiration device was then used to remove any remaining cortical material.  Polishing of the capsule was performed as needed. The intraocular lens was then placed into the capsular bag after it was re-inflated with viscoelastic. The remaining viscoelastic was then removed using the irrigation/aspiration device. BSS on a cannula was then used to hydrate the wound edges. At the end of the procedure the wound was found to be watertight, the anterior chamber was deep and the pupil round. No blood loss during surgery. An antibiotic and anti-inflammatroy was placed into the operative eye. The lid speculum was removed. Protective sunglasses were then placed onto the patient. The patient was taken to the [address] [place] [address] Unit (PACU) in good condition having tolerated the procedure well. Estimated Blood Loss: 0                 Implants:   Implant Name Type Inv.  Item Serial No.  Lot No. LRB No. Used Action   LENS POST SGL PC 6 13 19.0 -- [employer] - [id number]   LENS POST SGL PC 6 13 19.0 -- [employer] [id number] [employer]   Left 1 Implanted       Specimens: * No specimens in log *            Complications:  None           Aminta Thompson MD , MARNOLDO.  [unfilled]  8:09 AM

## 2018-03-21 NOTE — INTERVAL H&P NOTE
H&P Update:  Umu Pantoja was seen and examined. History and physical has been reviewed. The patient has been examined.  There have been no significant clinical changes since the completion of the originally dated History and Physical.    Signed By: Manolo Tee MD     March 21, 2018 7:23 AM

## 2018-03-21 NOTE — DISCHARGE INSTRUCTIONS
DISCHARGE SUMMARY from Nurse    PATIENT INSTRUCTIONS:    After general anesthesia or intravenous sedation, for 24 hours or while taking prescription Narcotics:  · Limit your activities  · Do not drive and operate hazardous machinery  · Do not make important personal or business decisions  · Do  not drink alcoholic beverages  · If you have not urinated within 8 hours after discharge, please contact your surgeon on call. Report the following to your surgeon:  · Excessive pain, swelling, redness or odor of or around the surgical area  · Temperature over 100.5  · Nausea and vomiting lasting longer than 4 hours or if unable to take medications  · Any signs of decreased circulation or nerve impairment to extremity: change in color, persistent  numbness, tingling, coldness or increase pain  · Any questions    What to do at Home:  Regular diet  Please follow post op instructions received from dr Sameer Valencia  Return to office on Thursday as scheduled    If you experience any of the following symptoms bleeding, nausea,severe pain, please follow up with dr Sameer Valencia    *  Please give a list of your current medications to your Primary Care Provider. *  Please update this list whenever your medications are discontinued, doses are      changed, or new medications (including over-the-counter products) are added. *  Please carry medication information at all times in case of emergency situations. These are general instructions for a healthy lifestyle:    No smoking/ No tobacco products/ Avoid exposure to second hand smoke  Surgeon General's Warning:  Quitting smoking now greatly reduces serious risk to your health.     Obesity, smoking, and sedentary lifestyle greatly increases your risk for illness    A healthy diet, regular physical exercise & weight monitoring are important for maintaining a healthy lifestyle    You may be retaining fluid if you have a history of heart failure or if you experience any of the following symptoms:  Weight gain of 3 pounds or more overnight or 5 pounds in a week, increased swelling in our hands or feet or shortness of breath while lying flat in bed. Please call your doctor as soon as you notice any of these symptoms; do not wait until your next office visit. Recognize signs and symptoms of STROKE:    F-face looks uneven    A-arms unable to move or move unevenly    S-speech slurred or non-existent    T-time-call 911 as soon as signs and symptoms begin-DO NOT go       Back to bed or wait to see if you get better-TIME IS BRAIN. Warning Signs of HEART ATTACK     Call 911 if you have these symptoms:   Chest discomfort. Most heart attacks involve discomfort in the center of the chest that lasts more than a few minutes, or that goes away and comes back. It can feel like uncomfortable pressure, squeezing, fullness, or pain.  Discomfort in other areas of the upper body. Symptoms can include pain or discomfort in one or both arms, the back, neck, jaw, or stomach.  Shortness of breath with or without chest discomfort.  Other signs may include breaking out in a cold sweat, nausea, or lightheadedness. Don't wait more than five minutes to call 911 - MINUTES MATTER! Fast action can save your life. Calling 911 is almost always the fastest way to get lifesaving treatment. Emergency Medical Services staff can begin treatment when they arrive -- up to an hour sooner than if someone gets to the hospital by car. The discharge information has been reviewed with the patient and caregiver. The patient and caregiver verbalized understanding. Discharge medications reviewed with the patient and caregiver and appropriate educational materials and side effects teaching were provided.   ___________________________________________________________________________________________________________________________________Post-Operative Cataract Instructions  [address] M.D. [address] [facility]  [phone]      Diet  1. Resume normal diet. 2. Do not drink alcoholic beverages, including beer for 24 hours. Activity  1. Do not drive a car or operate any hazardous machinery the day of surgery. 2. You may resume normal activities as tolerated. 3. No bending or heavy lifting. 4. No reading or computer work after your surgery. You may watch TV. Wound Care  1. Anticipate that your eye will tear and water. 2. You may also experience a sensation of a foreign body, sand, or grit in the surgical eye, this is normal.  3. Do not touch the affected eye. 4. ** Do not remove eye shield unless directed to do so by your physician. **  5. Wear eye shield when resting or sleeping for one week. Medications  1. Take Tylenol Extra Strength two (2) tablets by mouth upon arrival home and then every four (4) hours as needed for discomfort. 2. Regarding Eye drops:  -  Begin using your eye drops as directed by your physician in the (left) operative eye. One drop of     []   Zymar    [x]  Vigamox   along with one (1) drop     []  Acular    [x]  Voltaren   every four (4) hours while awake. Wait five (5) minutes then apply one (1) drop     []  Pred Forte    [x]  Econopred Plus   every four (4) hours while awake. 3. If you take glaucoma medications, continue to do so unless the physician otherwise instructs you. 4. You may use artificial tears as needed if your eye feels scratchy. Notify your Physician Immediately for any of the followin. Excessive pain not relieved by Tylenol especially headache or increasing pressure to the operative eye. 2. Persistent nausea lasting more than eight hours. 3. If any vomiting occurs. If any questions or concerns arise, call your Surgeon at [phone].     Patient armband removed and shredded

## 2018-04-18 ENCOUNTER — ANESTHESIA EVENT (OUTPATIENT)
Dept: SURGERY | Age: 68
End: 2018-04-18
Payer: MEDICARE

## 2018-04-18 ENCOUNTER — ANESTHESIA (OUTPATIENT)
Dept: SURGERY | Age: 68
End: 2018-04-18
Payer: MEDICARE

## 2018-04-18 ENCOUNTER — HOSPITAL ENCOUNTER (OUTPATIENT)
Age: 68
Setting detail: OUTPATIENT SURGERY
Discharge: HOME | End: 2018-04-18
Attending: OPHTHALMOLOGY | Admitting: OPHTHALMOLOGY
Payer: MEDICARE

## 2018-04-18 VITALS
HEART RATE: 64 BPM | WEIGHT: 185.44 LBS | HEIGHT: 68 IN | SYSTOLIC BLOOD PRESSURE: 116 MMHG | DIASTOLIC BLOOD PRESSURE: 64 MMHG | RESPIRATION RATE: 12 BRPM | BODY MASS INDEX: 28.1 KG/M2 | TEMPERATURE: 97.3 F | OXYGEN SATURATION: 95 %

## 2018-04-18 PROCEDURE — 74011250636 HC RX REV CODE- 250/636: Performed by: OPHTHALMOLOGY

## 2018-04-18 PROCEDURE — 77030006704 HC BLD OPHTH SLT ALCN -B: Performed by: OPHTHALMOLOGY

## 2018-04-18 PROCEDURE — 77030018606 HC TIP PHACO4 J&J -B: Performed by: OPHTHALMOLOGY

## 2018-04-18 PROCEDURE — 76060000031 HC ANESTHESIA FIRST 0.5 HR: Performed by: OPHTHALMOLOGY

## 2018-04-18 PROCEDURE — 74011000250 HC RX REV CODE- 250: Performed by: OPHTHALMOLOGY

## 2018-04-18 PROCEDURE — 77030018846 HC SOL IRR STRL H20 ICUM -A: Performed by: OPHTHALMOLOGY

## 2018-04-18 PROCEDURE — 74011250636 HC RX REV CODE- 250/636

## 2018-04-18 PROCEDURE — 76210000026 HC REC RM PH II 1 TO 1.5 HR: Performed by: OPHTHALMOLOGY

## 2018-04-18 PROCEDURE — 74011000250 HC RX REV CODE- 250

## 2018-04-18 PROCEDURE — V2632 POST CHMBR INTRAOCULAR LENS: HCPCS | Performed by: OPHTHALMOLOGY

## 2018-04-18 PROCEDURE — 76010000154 HC OR TIME FIRST 0.5 HR: Performed by: OPHTHALMOLOGY

## 2018-04-18 DEVICE — LENS IO +185 DIOPT L13MM DIA6MM 0DEG HAPTIC ANG A CONSTANT: Type: IMPLANTABLE DEVICE | Site: EYE | Status: FUNCTIONAL

## 2018-04-18 RX ORDER — NALOXONE HYDROCHLORIDE 0.4 MG/ML
0.1 INJECTION, SOLUTION INTRAMUSCULAR; INTRAVENOUS; SUBCUTANEOUS
Status: CANCELLED | OUTPATIENT
Start: 2018-04-18

## 2018-04-18 RX ORDER — DEXTROSE 50 % IN WATER (D50W) INTRAVENOUS SYRINGE
25-50 AS NEEDED
Status: CANCELLED | OUTPATIENT
Start: 2018-04-18

## 2018-04-18 RX ORDER — TROPICAMIDE 10 MG/ML
1 SOLUTION/ DROPS OPHTHALMIC
Status: COMPLETED | OUTPATIENT
Start: 2018-04-18 | End: 2018-04-18

## 2018-04-18 RX ORDER — PROPOFOL 10 MG/ML
INJECTION, EMULSION INTRAVENOUS AS NEEDED
Status: DISCONTINUED | OUTPATIENT
Start: 2018-04-18 | End: 2018-04-18 | Stop reason: HOSPADM

## 2018-04-18 RX ORDER — HYDROMORPHONE HYDROCHLORIDE 2 MG/ML
0.5 INJECTION, SOLUTION INTRAMUSCULAR; INTRAVENOUS; SUBCUTANEOUS
Status: CANCELLED | OUTPATIENT
Start: 2018-04-18

## 2018-04-18 RX ORDER — SODIUM CHLORIDE, SODIUM LACTATE, POTASSIUM CHLORIDE, CALCIUM CHLORIDE 600; 310; 30; 20 MG/100ML; MG/100ML; MG/100ML; MG/100ML
50 INJECTION, SOLUTION INTRAVENOUS CONTINUOUS
Status: CANCELLED | OUTPATIENT
Start: 2018-04-18

## 2018-04-18 RX ORDER — MAGNESIUM SULFATE 100 %
4 CRYSTALS MISCELLANEOUS AS NEEDED
Status: CANCELLED | OUTPATIENT
Start: 2018-04-18

## 2018-04-18 RX ORDER — OXYCODONE AND ACETAMINOPHEN 5; 325 MG/1; MG/1
1 TABLET ORAL AS NEEDED
Status: CANCELLED | OUTPATIENT
Start: 2018-04-18

## 2018-04-18 RX ORDER — TOBRAMYCIN 3 MG/ML
1 SOLUTION/ DROPS OPHTHALMIC
Status: DISCONTINUED | OUTPATIENT
Start: 2018-04-18 | End: 2018-04-18 | Stop reason: HOSPADM

## 2018-04-18 RX ORDER — SODIUM CHLORIDE, SODIUM LACTATE, POTASSIUM CHLORIDE, CALCIUM CHLORIDE 600; 310; 30; 20 MG/100ML; MG/100ML; MG/100ML; MG/100ML
75 INJECTION, SOLUTION INTRAVENOUS CONTINUOUS
Status: DISCONTINUED | OUTPATIENT
Start: 2018-04-18 | End: 2018-04-18 | Stop reason: HOSPADM

## 2018-04-18 RX ORDER — PHENYLEPHRINE HYDROCHLORIDE 25 MG/ML
1 SOLUTION/ DROPS OPHTHALMIC
Status: COMPLETED | OUTPATIENT
Start: 2018-04-18 | End: 2018-04-18

## 2018-04-18 RX ORDER — SODIUM CHLORIDE 0.9 % (FLUSH) 0.9 %
5-10 SYRINGE (ML) INJECTION AS NEEDED
Status: CANCELLED | OUTPATIENT
Start: 2018-04-18

## 2018-04-18 RX ORDER — INSULIN LISPRO 100 [IU]/ML
INJECTION, SOLUTION INTRAVENOUS; SUBCUTANEOUS ONCE
Status: CANCELLED | OUTPATIENT
Start: 2018-04-18 | End: 2018-04-18

## 2018-04-18 RX ORDER — LIDOCAINE HYDROCHLORIDE 10 MG/ML
INJECTION, SOLUTION EPIDURAL; INFILTRATION; INTRACAUDAL; PERINEURAL AS NEEDED
Status: DISCONTINUED | OUTPATIENT
Start: 2018-04-18 | End: 2018-04-18 | Stop reason: HOSPADM

## 2018-04-18 RX ORDER — ONDANSETRON 2 MG/ML
4 INJECTION INTRAMUSCULAR; INTRAVENOUS ONCE
Status: CANCELLED | OUTPATIENT
Start: 2018-04-18 | End: 2018-04-18

## 2018-04-18 RX ORDER — FENTANYL CITRATE 50 UG/ML
INJECTION, SOLUTION INTRAMUSCULAR; INTRAVENOUS AS NEEDED
Status: DISCONTINUED | OUTPATIENT
Start: 2018-04-18 | End: 2018-04-18 | Stop reason: HOSPADM

## 2018-04-18 RX ORDER — FENTANYL CITRATE 50 UG/ML
25 INJECTION, SOLUTION INTRAMUSCULAR; INTRAVENOUS
Status: CANCELLED | OUTPATIENT
Start: 2018-04-18 | End: 2018-04-18

## 2018-04-18 RX ORDER — ONDANSETRON 2 MG/ML
INJECTION INTRAMUSCULAR; INTRAVENOUS AS NEEDED
Status: DISCONTINUED | OUTPATIENT
Start: 2018-04-18 | End: 2018-04-18 | Stop reason: HOSPADM

## 2018-04-18 RX ORDER — LIDOCAINE HYDROCHLORIDE 20 MG/ML
INJECTION, SOLUTION EPIDURAL; INFILTRATION; INTRACAUDAL; PERINEURAL AS NEEDED
Status: DISCONTINUED | OUTPATIENT
Start: 2018-04-18 | End: 2018-04-18 | Stop reason: HOSPADM

## 2018-04-18 RX ORDER — EPINEPHRINE 1 MG/ML
INJECTION, SOLUTION, CONCENTRATE INTRAVENOUS AS NEEDED
Status: DISCONTINUED | OUTPATIENT
Start: 2018-04-18 | End: 2018-04-18 | Stop reason: HOSPADM

## 2018-04-18 RX ADMIN — PHENYLEPHRINE HYDROCHLORIDE 1 DROP: 2.5 SOLUTION/ DROPS OPHTHALMIC at 07:19

## 2018-04-18 RX ADMIN — LIDOCAINE HYDROCHLORIDE 40 MG: 20 INJECTION, SOLUTION EPIDURAL; INFILTRATION; INTRACAUDAL; PERINEURAL at 08:23

## 2018-04-18 RX ADMIN — SODIUM CHLORIDE, SODIUM LACTATE, POTASSIUM CHLORIDE, AND CALCIUM CHLORIDE 75 ML/HR: 600; 310; 30; 20 INJECTION, SOLUTION INTRAVENOUS at 07:35

## 2018-04-18 RX ADMIN — PROPOFOL 30 MG: 10 INJECTION, EMULSION INTRAVENOUS at 08:31

## 2018-04-18 RX ADMIN — ONDANSETRON 4 MG: 2 INJECTION INTRAMUSCULAR; INTRAVENOUS at 08:23

## 2018-04-18 RX ADMIN — FENTANYL CITRATE 50 MCG: 50 INJECTION, SOLUTION INTRAMUSCULAR; INTRAVENOUS at 08:20

## 2018-04-18 RX ADMIN — LIDOCAINE HYDROCHLORIDE 2 DROP: 35 GEL OPHTHALMIC at 07:39

## 2018-04-18 RX ADMIN — PHENYLEPHRINE HYDROCHLORIDE 1 DROP: 2.5 SOLUTION/ DROPS OPHTHALMIC at 07:33

## 2018-04-18 RX ADMIN — PHENYLEPHRINE HYDROCHLORIDE 1 DROP: 2.5 SOLUTION/ DROPS OPHTHALMIC at 07:26

## 2018-04-18 RX ADMIN — TOBRAMYCIN 1 DROP: 3 SOLUTION/ DROPS OPHTHALMIC at 07:20

## 2018-04-18 RX ADMIN — PROPOFOL 20 MG: 10 INJECTION, EMULSION INTRAVENOUS at 08:28

## 2018-04-18 RX ADMIN — TROPICAMIDE 1 DROP: 10 SOLUTION/ DROPS OPHTHALMIC at 07:26

## 2018-04-18 RX ADMIN — PROPOFOL 20 MG: 10 INJECTION, EMULSION INTRAVENOUS at 08:23

## 2018-04-18 RX ADMIN — PHENYLEPHRINE HYDROCHLORIDE 1 DROP: 2.5 SOLUTION/ DROPS OPHTHALMIC at 07:38

## 2018-04-18 RX ADMIN — TROPICAMIDE 1 DROP: 10 SOLUTION/ DROPS OPHTHALMIC at 07:34

## 2018-04-18 RX ADMIN — TROPICAMIDE 1 DROP: 10 SOLUTION/ DROPS OPHTHALMIC at 07:38

## 2018-04-18 RX ADMIN — FENTANYL CITRATE 25 MCG: 50 INJECTION, SOLUTION INTRAMUSCULAR; INTRAVENOUS at 08:27

## 2018-04-18 RX ADMIN — TROPICAMIDE 1 DROP: 10 SOLUTION/ DROPS OPHTHALMIC at 07:20

## 2018-04-18 NOTE — INTERVAL H&P NOTE
H&P Update:  Antwan Dee was seen and examined. History and physical has been reviewed. The patient has been examined.  There have been no significant clinical changes since the completion of the originally dated History and Physical.    Signed By: Siddhartha Frye MD     April 18, 2018 7:19 AM

## 2018-04-18 NOTE — IP AVS SNAPSHOT
[address] [address] 
[phone] Patient: Vlad Wells MRN: [id number] ZHS:[id number] About your hospitalization You were admitted on:  April 18, 2018 You last received care in the:  [address] [place] [address] You were discharged on:  April 18, 2018 Why you were hospitalized Your primary diagnosis was:  Not on File Follow-up Information Follow up With Details Comments Contact Info Selma Gonzalez MD   [address] [address] [phone] Discharge Orders None A check price indicates which time of day the medication should be taken. My Medications CONTINUE taking these medications Instructions Each Dose to Equal  
 Morning Noon Evening Bedtime AMBIEN 10 mg tablet Generic drug:  zolpidem Your last dose was: Your next dose is: Take 10 mg by mouth nightly. 10 mg  
    
   
   
   
  
 ATIVAN 0.5 mg tablet Generic drug:  LORazepam  
   
Your last dose was: Your next dose is: Take 0.5 mg by mouth every four (4) hours as needed. 0.5 mg  
    
   
   
   
  
 cholecalciferol (VITAMIN D3) 5,000 unit Tab tablet Commonly known as:  VITAMIN D3 Your last dose was: Your next dose is: Take 5,000 Units by mouth nightly. 5000 Units  
    
   
   
   
  
 cranberry extract 450 mg Tab tablet Your last dose was: Your next dose is: Take 450 mg by mouth daily. 450 mg  
    
   
   
   
  
 CYMBALTA 60 mg capsule Generic drug:  DULoxetine Your last dose was: Your next dose is: Take 60 mg by mouth daily. Indications: NEUROPATHIC PAIN  
 60 mg  
    
   
   
   
  
 ferrous sulfate 325 mg (65 mg iron) tablet Your last dose was: Your next dose is: Take 325 mg by mouth Daily (before breakfast).   
 325 mg  
    
   
   
   
  
 KRILL OIL PO Your last dose was: Your next dose is: Take 75 mg by mouth daily. 75 mg  
    
   
   
   
  
 LASIX 40 mg tablet Generic drug:  furosemide Your last dose was: Your next dose is: Take 40 mg by mouth daily. 40 mg  
    
   
   
   
  
 multivitamin tablet Commonly known as:  ONE A DAY Your last dose was: Your next dose is: Take 1 Tab by mouth daily. Indications: 3 gummys bid 1 Tab PROBIOTIC 4X 10-15 mg Tbec Generic drug:  B.infantis-B.ani-B.long-B.bifi Your last dose was: Your next dose is: Take 3 Caps by mouth nightly. 3 Cap  
    
   
   
   
  
 promethazine 25 mg tablet Commonly known as:  PHENERGAN Your last dose was: Your next dose is: Take 25 mg by mouth daily as needed for Nausea. 25 mg  
    
   
   
   
  
 simvastatin 20 mg tablet Commonly known as:  ZOCOR Your last dose was: Your next dose is: Take 20 mg by mouth daily. 20 mg  
    
   
   
   
  
 SYNTHROID 100 mcg tablet Generic drug:  levothyroxine Your last dose was: Your next dose is: Take 100 mcg by mouth daily. 100 mcg VITAMIN E PO Your last dose was: Your next dose is: Take 400 Units by mouth daily. 400 Units Discharge Instructions DISCHARGE SUMMARY from Nurse PATIENT INSTRUCTIONS: 
 
 
F-face looks uneven A-arms unable to move or move unevenly S-speech slurred or non-existent T-time-call 911 as soon as signs and symptoms begin-DO NOT go Back to bed or wait to see if you get better-TIME IS BRAIN. Warning Signs of HEART ATTACK Call 911 if you have these symptoms: 
? Chest discomfort. Most heart attacks involve discomfort in the center of the chest that lasts more than a few minutes, or that goes away and comes back. It can feel like uncomfortable pressure, squeezing, fullness, or pain. ? Discomfort in other areas of the upper body. Symptoms can include pain or discomfort in one or both arms, the back, neck, jaw, or stomach. ? Shortness of breath with or without chest discomfort. ? Other signs may include breaking out in a cold sweat, nausea, or lightheadedness. Don't wait more than five minutes to call [address]! Fast action can save your life. Calling 911 is almost always the fastest way to get lifesaving treatment. Emergency Medical Services staff can begin treatment when they arrive  up to an hour sooner than if someone gets to the hospital by car. The discharge information has been reviewed with the patient and caregiver. The patient and caregiver verbalized understanding. Discharge medications reviewed with the patient and caregiver and appropriate educational materials and side effects teaching were provided. ___________________________________________________________________________________________________________________________________Post-Operative Cataract Instructions [facility] [employer] MARICRUZ Leos M.D. [address] [facility] 
[phone] Diet 1. Resume normal diet. 2. Do not drink alcoholic beverages, including beer for 24 hours. Activity 1. Do not drive a car or operate any hazardous machinery the day of surgery. 2. You may resume normal activities as tolerated. 3. No bending or heavy lifting. 4. No reading or computer work after your surgery. You may watch TV. Wound Care 1. Anticipate that your eye will tear and water. 2. You may also experience a sensation of a foreign body, sand, or grit in the surgical eye, this is normal. 
3. Do not touch the affected eye. 4. ** Do not remove eye shield unless directed to do so by your physician. ** 
5. Wear eye shield when resting or sleeping for one week. Medications 1. Take Tylenol Extra Strength two (2) tablets by mouth upon arrival home and then every four (4) hours as needed for discomfort. 2. Regarding Eye drops: 
-  Begin using your eye drops as directed by your physician in the (right) operative eye. One drop of     []   Zymar    [x]  Vigamox  
along with one (1) drop     []  Acular    [x]  Voltaren  
every four (4) hours while awake. Wait five (5) minutes then apply one (1) drop     []  Pred Forte    [x]  Econopred Plus  
every four (4) hours while awake. 3. If you take glaucoma medications, continue to do so unless the physician otherwise instructs you. 4. You may use artificial tears as needed if your eye feels scratchy. Notify your Physician Immediately for any of the followin. Excessive pain not relieved by Tylenol especially headache or increasing pressure to the operative eye. 2. Persistent nausea lasting more than eight hours. 3. If any vomiting occurs. If any questions or concerns arise, call your Surgeon at [phone]. Patient armband removed and shredded Introducing [facility] SERVICES! Dear Kim Zhou: Thank you for requesting a [employer] account. Our records indicate that you already have an active [employer] account. You can access your account anytime at [email or web address]/"[email or web address] Did you know that you can access your hospital and ER discharge instructions at any time in [employer]? You can also review all of your test results from your hospital stay or ER visit. Additional Information If you have questions, please visit the Frequently Asked Questions section of the MyChart website at [email or web address] Remember, [employer] is NOT to be used for urgent needs. For medical emergencies, dial 911. Now available from your iPhone and Android! Introducing Stiven Lopez As a [employer] patient, I wanted to make you aware of our electronic visit tool called Stiven Lopez. [employer] 24/7 allows you to connect within minutes with a medical provider 24 hours a day, seven days a week via a mobile device or tablet or logging into a secure website from your computer. You can access Stiven Lopez from anywhere in the [place]. A virtual visit might be right for you when you have a simple condition and feel like you just dont want to get out of bed, or cant get away from work for an appointment, when your regular [employer] provider is not available (evenings, weekends or holidays), or when youre out of town and need minor care. Electronic visits cost only $49 and if the [employer] 24/7 provider determines a prescription is needed to treat your condition, one can be electronically transmitted to a nearby pharmacy*. Please take a moment to enroll today if you have not already done so. The enrollment process is free and takes just a few minutes. To enroll, please download the [employer] 24/7 lindy to your tablet or phone, or visit [email or web address] to enroll on your computer. And, as an [address] [place] [address] patient with a [employer] account, the results of your visits will be scanned into your electronic medical record and your primary care provider will be able to view the scanned results. We urge you to continue to see your regular [employer] provider for your ongoing medical care.   And while your primary care provider may not be the one available when you seek a Stiven Stephensjohnfin virtual visit, the peace of mind you get from getting a real diagnosis real time can be priceless. For more information on ADVIZEjohnfin, view our Frequently Asked Questions (FAQs) at [email or web address]. Sincerely, 
 
Landen Bundy MD 
Chief Medical Officer [address] *:  certain medications cannot be prescribed via Stiven Angeloruiz Providers Seen During Your Hospitalization Provider Specialty Primary office phone Gianfranco Castelan MD Ophthalmology [phone] Your Primary Care Physician (PCP) Primary Care Physician Office Phone Office Fax Betty Mayjan [phone] You are allergic to the following Allergen Reactions Ciprofibrate Anaphylaxis Levaquin (Levofloxacin) Anaphylaxis Sulfa (Sulfonamide Antibiotics) Anaphylaxis Rash Codeine Nausea and Vomiting Versed (Midazolam) Nausea and Vomiting Recent Documentation Height Weight BMI OB Status Smoking Status 1.727 m 84.1 kg 28.2 kg/m2 Hysterectomy Former Smoker Emergency Contacts Name Discharge Info Relation Home Work Mobile [address] CAREGIVER [3] Spouse [3]   [phone] Patient Belongings The following personal items are in your possession at time of discharge: 
  Dental Appliances: None  Visual Aid: Glasses, At home      Home Medications: Kept at bedside (eye meds)   Jewelry: None  Clothing: Footwear, Sweater, Undergarments (locker 17)    Other Valuables: None Please provide this summary of care documentation to your next provider. Signatures-by signing, you are acknowledging that this After Visit Summary has been reviewed with you and you have received a copy. Patient Signature:  ____________________________________________________________ Date:  ____________________________________________________________  
  
Shelia Elisabet Provider Signature:  ____________________________________________________________ Date:  ____________________________________________________________

## 2018-04-18 NOTE — ANESTHESIA PREPROCEDURE EVALUATION
Anesthetic History   No history of anesthetic complications            Review of Systems / Medical History  Patient summary reviewed, nursing notes reviewed and pertinent labs reviewed    Pulmonary  Within defined limits                 Neuro/Psych   Within defined limits           Cardiovascular  Within defined limits                     GI/Hepatic/Renal  Within defined limits              Endo/Other      Hypothyroidism  Morbid obesity     Other Findings            Physical Exam    Airway  Mallampati: II  TM Distance: 4 - 6 cm  Neck ROM: normal range of motion   Mouth opening: Normal     Cardiovascular  Regular rate and rhythm,  S1 and S2 normal,  no murmur, click, rub, or gallop             Dental  No notable dental hx       Pulmonary  Breath sounds clear to auscultation               Abdominal  GI exam deferred       Other Findings            Anesthetic Plan    ASA: 2  Anesthesia type: MAC          Induction: Intravenous  Anesthetic plan and risks discussed with: Family and Patient

## 2018-04-18 NOTE — DISCHARGE INSTRUCTIONS
DISCHARGE SUMMARY from Nurse    PATIENT INSTRUCTIONS:    After general anesthesia or intravenous sedation, for 24 hours or while taking prescription Narcotics:  · Limit your activities  · Do not drive and operate hazardous machinery  · Do not make important personal or business decisions  · Do  not drink alcoholic beverages  · If you have not urinated within 8 hours after discharge, please contact your surgeon on call. Report the following to your surgeon:  · Excessive pain, swelling, redness or odor of or around the surgical area  · Temperature over 100.5  · Nausea and vomiting lasting longer than 4 hours or if unable to take medications  · Any signs of decreased circulation or nerve impairment to extremity: change in color, persistent  numbness, tingling, coldness or increase pain  · Any questions    What to do at Home:  Regular diet  Please follow post eye drop instructions received from dr Beverly Anaya  Return to office on Thursday as scheduled    If you experience any of the following symptoms bleeding. Nausea, severe pain, please follow up with dr Beverly Anaya    *  Please give a list of your current medications to your Primary Care Provider. *  Please update this list whenever your medications are discontinued, doses are      changed, or new medications (including over-the-counter products) are added. *  Please carry medication information at all times in case of emergency situations. These are general instructions for a healthy lifestyle:    No smoking/ No tobacco products/ Avoid exposure to second hand smoke  Surgeon General's Warning:  Quitting smoking now greatly reduces serious risk to your health.     Obesity, smoking, and sedentary lifestyle greatly increases your risk for illness    A healthy diet, regular physical exercise & weight monitoring are important for maintaining a healthy lifestyle    You may be retaining fluid if you have a history of heart failure or if you experience any of the following symptoms:  Weight gain of 3 pounds or more overnight or 5 pounds in a week, increased swelling in our hands or feet or shortness of breath while lying flat in bed. Please call your doctor as soon as you notice any of these symptoms; do not wait until your next office visit. Recognize signs and symptoms of STROKE:    F-face looks uneven    A-arms unable to move or move unevenly    S-speech slurred or non-existent    T-time-call 911 as soon as signs and symptoms begin-DO NOT go       Back to bed or wait to see if you get better-TIME IS BRAIN. Warning Signs of HEART ATTACK     Call 911 if you have these symptoms:   Chest discomfort. Most heart attacks involve discomfort in the center of the chest that lasts more than a few minutes, or that goes away and comes back. It can feel like uncomfortable pressure, squeezing, fullness, or pain.  Discomfort in other areas of the upper body. Symptoms can include pain or discomfort in one or both arms, the back, neck, jaw, or stomach.  Shortness of breath with or without chest discomfort.  Other signs may include breaking out in a cold sweat, nausea, or lightheadedness. Don't wait more than five minutes to call 911 - MINUTES MATTER! Fast action can save your life. Calling 911 is almost always the fastest way to get lifesaving treatment. Emergency Medical Services staff can begin treatment when they arrive -- up to an hour sooner than if someone gets to the hospital by car. The discharge information has been reviewed with the patient and caregiver. The patient and caregiver verbalized understanding. Discharge medications reviewed with the patient and caregiver and appropriate educational materials and side effects teaching were provided.   ___________________________________________________________________________________________________________________________________Post-Operative Cataract Instructions  [address] Luna Blanchard M.D. MARICRUZ [address], [facility]  [phone]      Diet  1. Resume normal diet. 2. Do not drink alcoholic beverages, including beer for 24 hours. Activity  1. Do not drive a car or operate any hazardous machinery the day of surgery. 2. You may resume normal activities as tolerated. 3. No bending or heavy lifting. 4. No reading or computer work after your surgery. You may watch TV. Wound Care  1. Anticipate that your eye will tear and water. 2. You may also experience a sensation of a foreign body, sand, or grit in the surgical eye, this is normal.  3. Do not touch the affected eye. 4. ** Do not remove eye shield unless directed to do so by your physician. **  5. Wear eye shield when resting or sleeping for one week. Medications  1. Take Tylenol Extra Strength two (2) tablets by mouth upon arrival home and then every four (4) hours as needed for discomfort. 2. Regarding Eye drops:  -  Begin using your eye drops as directed by your physician in the (right) operative eye. One drop of     []   Zymar    [x]  Vigamox   along with one (1) drop     []  Acular    [x]  Voltaren   every four (4) hours while awake. Wait five (5) minutes then apply one (1) drop     []  Pred Forte    [x]  Econopred Plus   every four (4) hours while awake. 3. If you take glaucoma medications, continue to do so unless the physician otherwise instructs you. 4. You may use artificial tears as needed if your eye feels scratchy. Notify your Physician Immediately for any of the followin. Excessive pain not relieved by Tylenol especially headache or increasing pressure to the operative eye. 2. Persistent nausea lasting more than eight hours. 3. If any vomiting occurs. If any questions or concerns arise, call your Surgeon at [phone].     Patient armband removed and shredded

## 2018-04-18 NOTE — ANESTHESIA POSTPROCEDURE EVALUATION
Post-Anesthesia Evaluation and Assessment    Cardiovascular Function/Vital Signs  Visit Vitals    /64    Pulse 64    Temp 36.3 °C (97.3 °F)    Resp 12    Ht 5' 8\" (1.727 m)    Wt 84.1 kg (185 lb 7 oz)    SpO2 95%    BMI 28.2 kg/m2       Patient is status post Procedure(s):  CATARACT EXTRACTION WITH INTRA OCULAR LENS IMPLANT-RIGHT EYE  . Nausea/Vomiting: Controlled. Postoperative hydration reviewed and adequate. Pain:  Pain Scale 1: Numeric (0 - 10) (04/18/18 0853)  Pain Intensity 1: 0 (04/18/18 0853)   Managed. Neurological Status:   Neuro (WDL): Within Defined Limits (04/18/18 0725)   At baseline. Mental Status and Level of Consciousness: Baseline and stable. Pulmonary Status:   O2 Device: Room air (04/18/18 0853)   Adequate oxygenation and airway patent. Complications related to anesthesia: None    Post-anesthesia assessment completed. No concerns. Patient has met all discharge requirements.     Signed By: Emilee Russ MD

## 2018-04-18 NOTE — OP NOTES
Cataract Operative Note      Patient: Joy Breaux               Sex: female          DOA: 4/18/2018         YOB: 1950      Age:  69 y.o.        LOS:  LOS: 0 days     Preoperative Diagnosis: Cataract right eye    Postoperative Diagnosis:  Cataract  right eye  Surgeon: Tez Giles MD, M.D. Anesthesia:  Topical anesthesia  Procedure:  Phacoemulsification of posterior chamber for intraocular lens implantation right    Fluids:  0    Procedure in Detail: The operative eye was prepped and draped in the usual fashion. A lid speculum was placed in the operative eye. A clear cornea approach was utilized. A paracentesis incision(s) was constructed with a 1 mm slit knife. One percent preservative-free lidocaine followed by viscoelastic was instilled into the anterior chamber. A clear corneal incision was made with a slit knife. A continuous curvilinear capsulorrhexis was constructed followed by hydrodissection. A phacoemulsification tip was placed into the eye, and the lens nucleus was emulsified. The irrigation/aspiration device was then used to remove any remaining cortical material.  Polishing of the capsule was performed as needed. The intraocular lens was then placed into the capsular bag after it was re-inflated with viscoelastic. The remaining viscoelastic was then removed using the irrigation/aspiration device. BSS on a cannula was then used to hydrate the wound edges. At the end of the procedure the wound was found to be watertight, the anterior chamber was deep and the pupil round. No blood loss during surgery. An antibiotic and anti-inflammatroy was placed into the operative eye. The lid speculum was removed. Protective sunglasses were then placed onto the patient. The patient was taken to the [address] [place] [address] Unit (PACU) in good condition having tolerated the procedure well. Estimated Blood Loss: 0                 Implants:   Implant Name Type Inv.  Item Serial No.  Lot No. LRB No. Used Action   LENS POST SGL PC 6 13 18.5 -- [employer] - [id number]   LENS POST SGL PC 6 13 18.5 -- [employer] [id number] [employer]   Right 1 Implanted       Specimens: * No specimens in log *            Complications:  None           Ghulam Lambert MD, M.D.  [unfilled]  8:39 AM

## 2018-04-18 NOTE — PERIOP NOTES
PATIENT SUPPLIED POST-OP DROPS: KETOROLAC, TOBRAMYCIN, PREDNISOLONE 1 GTT EACH right EYE AT END OF CASE. SUNGLASSES APPLIED AT END OF CASE.

## 2018-07-06 ENCOUNTER — OFFICE VISIT (OUTPATIENT)
Dept: SURGERY | Age: 68
End: 2018-07-06

## 2018-07-06 VITALS
RESPIRATION RATE: 16 BRPM | WEIGHT: 186.3 LBS | HEIGHT: 68 IN | DIASTOLIC BLOOD PRESSURE: 76 MMHG | SYSTOLIC BLOOD PRESSURE: 151 MMHG | HEART RATE: 70 BPM | OXYGEN SATURATION: 100 % | BODY MASS INDEX: 28.23 KG/M2

## 2018-07-06 DIAGNOSIS — E66.3 OVERWEIGHT (BMI 25.0-29.9): Primary | ICD-10-CM

## 2018-07-06 NOTE — PROGRESS NOTES
Subjective:     Mikhail Rubio  is a 69 y.o. female who presents for follow-up about 8 years following laparoscopic adjustable gastric band surgery over VBG/gastric bypass. She has lost a total of 91 pounds since surgery. Body mass index is 28.33 kg/(m^2). . EBWL is (68%). The patient presents today to assess their progress toward their goal of weight loss and to address any issues that may be present. Today the patient and I have reviewed their diet and how appropriate their food choices are. The following issues have been identified : no new issues other than requests adjustment. Weight Loss Metrics 7/6/2018 4/18/2018 4/6/2018 3/21/2018 3/20/2018 2/15/2018 8/23/2017   Pre op / Initial Wt - - - - - - 277   Today's Wt 186 lb 4.8 oz 185 lb 7 oz 180 lb 186 lb 3 oz 175 lb 180 lb -   BMI 28.33 kg/m2 28.2 kg/m2 27.37 kg/m2 28.31 kg/m2 26.61 kg/m2 27.37 kg/m2 -   Ideal Body Wt - - - - - - 143     . Surgery related complication: none       She reports hunger and denies vomiting. The patients diet choices have been reviewed today and are perfect  Patients pain score:0    Weight Loss Metrics 7/6/2018 4/18/2018 4/6/2018 3/21/2018 3/20/2018 2/15/2018 8/23/2017   Pre op / Initial Wt - - - - - - 277   Today's Wt 186 lb 4.8 oz 185 lb 7 oz 180 lb 186 lb 3 oz 175 lb 180 lb -   BMI 28.33 kg/m2 28.2 kg/m2 27.37 kg/m2 28.31 kg/m2 26.61 kg/m2 27.37 kg/m2 -   Ideal Body Wt - - - - - - 143        The patient's exercise level: very active. Changes in her medical history and medications have been reviewed.     Patient Active Problem List   Diagnosis Code    Diverticular disease K57.90    Colon polyp K63.5    Status post gastric bypass for obesity Z98.84    Status post gastric banding Z98.84    Intestinal malabsorption K90.9    Smoking history Z87.891    Cataract H26.9     Past Medical History:   Diagnosis Date    Adverse effect of anesthesia     versed makes her vomiting for two days    Bronchitis     Chronic pain arthritis & previous surgeries    Diverticulitis     History of blood transfusion     x2    Hypoglycemia     Hypothyroid     Intestinal malabsorption     Osteoporosis     Other ill-defined conditions(799.89)     bronchitis 2 weeks ago    Other ill-defined conditions(799.89)     collapsed pelvis on left side    Pelvic obliquity     Smoking history     quit in 1969    Status post gastric banding 03/2010    band over prior gastric bypass / Torres via Yoni     Past Surgical History:   Procedure Laterality Date    BREAST SURGERY PROCEDURE UNLISTED      3 partial lumpectomies, left and right    COLONOSCOPY N/A 12/16/2016    COLONOSCOPY w/ polypectomies performed [facility] Kaelyn Linda [facility] at [facility] HX GASTRIC BYPASS  2003    HX GASTRIC BYPASS  2009    lap band    HX HEENT      tonsillectomy and adenoidectomy    HX HEENT  2018    cataract left    HX HYSTERECTOMY      HX KNEE REPLACEMENT      bilateral knee    HX ORTHOPAEDIC      left hip replacement x 2,    HX SHOULDER REPLACEMENT      left    HX TONSIL AND ADENOIDECTOMY       Current Outpatient Prescriptions   Medication Sig Dispense Refill    promethazine (PHENERGAN) 25 mg tablet Take 25 mg by mouth daily as needed for Nausea.  B.infantis-B.ani-B.long-B.bifi (PROBIOTIC 4X) 10-15 mg TbEC Take 3 Caps by mouth nightly.  LORazepam (ATIVAN) 0.5 mg tablet Take 0.5 mg by mouth every four (4) hours as needed.  cranberry extract 450 mg tab Take 450 mg by mouth daily.  ferrous sulfate 325 mg (65 mg iron) tablet Take 325 mg by mouth Daily (before breakfast).  multivitamin (ONE A DAY) tablet Take 1 Tab by mouth daily. Indications: 3 gummys bid      zolpidem (AMBIEN) 10 mg tablet Take 10 mg by mouth nightly.  furosemide (LASIX) 40 mg tablet Take 40 mg by mouth daily.  DULoxetine (CYMBALTA) 60 mg capsule Take 60 mg by mouth daily.  Indications: NEUROPATHIC PAIN      simvastatin (ZOCOR) 20 mg tablet Take 20 mg by mouth daily.  levothyroxine (SYNTHROID) 100 mcg tablet Take 100 mcg by mouth daily.  Cholecalciferol, Vitamin D3, 5,000 unit Tab Take 5,000 Units by mouth nightly.  VITAMIN E ACETATE (VITAMIN E PO) Take 400 Units by mouth daily.  KRILL OIL PO Take 75 mg by mouth daily. Review of Symptoms:       General - No history or complaints of unexpected fever or chills  Head/Neck - No history or complaints of headache or dizziness  Cardiac - No history or complaints of chest pain, palpitations, or shortness of breath  Pulmonary - No history or complaints of shortness of breath or productive cough  Gastrointestinal - as noted above  Genitourinary - No history or complaints of hematuria/dysuria or renal lithiasis  Musculoskeletal - No history or complaints of joint  muscular weakness  Hematologic - No history of any bleeding episodes  Neurologic - No history or complaints of  migraine headaches or neurologic symptoms                     Objective:     Visit Vitals    /76 (BP 1 Location: Left arm, BP Patient Position: Sitting)    Pulse 70    Resp 16    Ht 5' 8\" (1.727 m)    Wt 84.5 kg (186 lb 4.8 oz)    SpO2 100%    BMI 28.33 kg/m2        Physical Exam:    General:  alert, cooperative, no distress, appears stated age   Lungs:   clear to auscultation bilaterally   Heart:  Regular rate and rhythm   Abdomen:   abdomen is soft without significant tenderness, masses, organomegaly or guarding;     Incisions: healing well, no hernias       Lab Results   Component Value Date/Time    WBC 6.4 06/28/2017 10:02 AM    Hemoglobin, POC 12.6 12/16/2016 09:04 AM    HGB 11.5 (L) 06/28/2017 10:02 AM    Hematocrit, POC 37 12/16/2016 09:04 AM    HCT 34.6 (L) 06/28/2017 10:02 AM    PLATELET 851 22/55/1615 10:02 AM    MCV 93.8 06/28/2017 10:02 AM     Lab Results   Component Value Date/Time    Sodium 144 06/28/2017 10:02 AM    Potassium 3.8 06/28/2017 10:02 AM    Chloride 106 06/28/2017 10:02 AM    CO2 29 06/28/2017 10:02 AM    Anion gap 9 06/28/2017 10:02 AM    Glucose 107 (H) 06/28/2017 10:02 AM    BUN 25 (H) 06/28/2017 10:02 AM    Creatinine 1.40 (H) 06/28/2017 10:02 AM    BUN/Creatinine ratio 18 06/28/2017 10:02 AM    GFR est AA 45 (L) 06/28/2017 10:02 AM    GFR est non-AA 37 (L) 06/28/2017 10:02 AM    Calcium 9.4 06/28/2017 10:02 AM    Bilirubin, total 0.4 06/28/2017 10:02 AM    AST (SGOT) 13 (L) 06/28/2017 10:02 AM    Alk. phosphatase 63 06/28/2017 10:02 AM    Protein, total 6.2 (L) 06/28/2017 10:02 AM    Albumin 3.4 06/28/2017 10:02 AM    Globulin 2.8 06/28/2017 10:02 AM    A-G Ratio 1.2 06/28/2017 10:02 AM    ALT (SGPT) 13 06/28/2017 10:02 AM     Lab Results   Component Value Date/Time    Iron 107 06/28/2017 10:02 AM    Ferritin 305 06/28/2017 10:02 AM     Lab Results   Component Value Date/Time    Folate >20.0 (H) 06/28/2017 10:02 AM     No results found for: VITD3, Peggye Coma, XQVID, VD3RIA        Assessment:     1. History of Morbid obesity, status post  laparoscopic adjustable gastric band surgery. Doing well, no concerns. Requests fill. Plan:     1. Remember to measure portions, continue low carbohydrate diet  2. Continue to concentrate on protein intake meeting daily requirements  3. Remember vitamin supplements. 4. Exercise regimen appears to be: very good  5. Try and attend support group if feasible. 6. Follow-up in 1 month(s). 7. Will perform adjustment today  8. Total time spent with the patient 30 minutes.     EDMUNDO [facility] CAMILA CRAWFORD  OFFICE PROCEDURE PROGRESS NOTE        Chart reviewed for the following:   I, Almedia Gosselin, MD, have reviewed the History, Physical and updated the Allergic reactions for [address] performed immediately prior to start of procedure:   Clyde Moscoso MD, have performed the following reviews on Mikhail Sake prior to the start of the procedure:            * Patient was identified by name and date of birth   * Agreement on procedure being performed was verified  * Risks and Benefits explained to the patient  * Procedure site verified and marked as necessary  * Patient was positioned for comfort  * Consent was signed and verified     Time: 8141      Date of procedure: 7/6/2018    Procedure performed by: Jaimee Mitchell MD    Provider assisted by:  none      Patient assisted by: self    How tolerated by patient: tolerated the procedure well with no complications    Post Procedural Pain Scale: 0 - No Hurt    Comments: none                    Subjective:   Olga Roberto is a 69 y.o. female with previous lap band surgery, who is here today needing lap band adjustment because of decreased satiety (pt. able to eat > 4 oz at one meal) and early sense of hunger (within 1-2 hours after eating). Dietary compliance is good. Objective:     Visit Vitals    /76 (BP 1 Location: Left arm, BP Patient Position: Sitting)    Pulse 70    Resp 16    Ht 5' 8\" (1.727 m)    Wt 84.5 kg (186 lb 4.8 oz)    SpO2 100%    BMI 28.33 kg/m2      Estimated body mass index is 28.33 kg/(m^2) as calculated from the following:    Height as of this encounter: 5' 8\" (1.727 m). Weight as of this encounter: 84.5 kg (186 lb 4.8 oz). Wt Readings from Last 3 Encounters:   07/06/18 84.5 kg (186 lb 4.8 oz)   04/18/18 84.1 kg (185 lb 7 oz)   04/06/18 81.6 kg (180 lb)     Her change in weight since last visit: lost,  21 lbs. The surgical area looks well healed and the port is properly located. Band Position: n/a. Assessment/Plan: Morbid Obesity, status post lap-band surgery, needing fill adjustment    Lap Band FiIl Procedure    The port area was cleaned with alcohol and a 20 gauge needle was inserted. Previous Fill Volume:  +1.25 cc. Added:  .25 cc   Total amount now in Band +1.5 ml    Patient tolerated the procedure well.   Follow up in 4 to 6 week

## 2019-02-20 ENCOUNTER — HOSPITAL ENCOUNTER (EMERGENCY)
Age: 69
Discharge: HOME | End: 2019-02-20
Attending: EMERGENCY MEDICINE
Payer: MEDICARE

## 2019-02-20 ENCOUNTER — TELEPHONE (OUTPATIENT)
Dept: SURGERY | Age: 69
End: 2019-02-20

## 2019-02-20 VITALS
DIASTOLIC BLOOD PRESSURE: 92 MMHG | TEMPERATURE: 98.4 F | RESPIRATION RATE: 20 BRPM | SYSTOLIC BLOOD PRESSURE: 151 MMHG | WEIGHT: 185 LBS | OXYGEN SATURATION: 100 % | HEIGHT: 68 IN | HEART RATE: 80 BPM | BODY MASS INDEX: 28.04 KG/M2

## 2019-02-20 DIAGNOSIS — Z87.898 HISTORY OF CHRONIC PAIN: ICD-10-CM

## 2019-02-20 DIAGNOSIS — R19.8 SENSATION OF FOREIGN BODY IN ESOPHAGUS: Primary | ICD-10-CM

## 2019-02-20 PROCEDURE — 99282 EMERGENCY DEPT VISIT SF MDM: CPT

## 2019-02-20 RX ORDER — METOPROLOL SUCCINATE 50 MG/1
TABLET, EXTENDED RELEASE ORAL
COMMUNITY
Start: 2019-02-19

## 2019-02-20 NOTE — TELEPHONE ENCOUNTER
Dr. Ale Lee please be advised presenting to ER today. Patient called with complaints of discomfort upon digestion. Felt like pills were 'stuck\". Advised patient this office would return call with office appt. For Lap Band adjustment 2/21/19. Patient verbalized understanding. Called pt at 3:15 to confirm appointment and patient stated she was \"on her way\" to the ER here @ [facility] due to severe pain described earlier.

## 2019-02-20 NOTE — ED PROVIDER NOTES
EMERGENCY DEPARTMENT HISTORY AND PHYSICAL EXAM 
 
Date: 2/20/2019 Patient Name: Leslye Sanchez History of Presenting Illness Chief Complaint Patient presents with  Foreign Body Swallowed History Provided By: Patient Chief Complaint: pills stuck in throat Duration: this morning ~11 AM 
Timing:  Acute Location: throat, head, GI Modifying Factors: Pt notes every time she swallows, she has \"spasms\" and that she normally sticks finger down throat to induce vomiting, usually for chicken and rice Associated Symptoms: decreased appetite change, HA, vomiting (forced by pt), subjective fever, muscle spasms, and productive cough (yellow phlegm) Additional History (Context):  
5:27 PM  
Leslye Sanchez is a 77 y.o. female with PMHX of diverticulitis and PSHX of gastric bypass (3-5 years ago) who presents to the emergency department C/O pills stuck in throat this morning ~11 AM. Associated sxs include decreased appetite change, HA, vomiting (forced by pt), subjective fever, muscle spasms, and productive cough (yellow phlegm). Pt notes she's been sick for the past 4-5 days as well. This morning pt took two Excedrin pills for HA and 4 \"morning pills\". Pt notes 5-7 seconds after taking pills she had a spasm and states that she feels like she has pills stuck. Pt called Dr. Styles Job office and was told to come to ED. Pt notes that she normally sticks finger down throat to induce vomiting, usually for chicken and rice. Pt states that food usually gets stuck in her throat once a month. Last time pt saw Dr. Juany Gross was August of 2018. Pt used to weigh 450 pounds and had surgery. Pt notes every time she swallows, she has \"spasms\". Last time pt had endoscopy was before lap band. Pt denies SOB, abd pain, and any other sxs or complaints. PCP: Serg Trevizo MD 
 
Current Outpatient Medications Medication Sig Dispense Refill  metoprolol succinate (TOPROL-XL) 50 mg XL tablet TAKE ONE TABLET BY MOUTH ONCE EVERY DAY  LORazepam (ATIVAN) 0.5 mg tablet Take 0.5 mg by mouth every four (4) hours as needed.  ferrous sulfate 325 mg (65 mg iron) tablet Take 325 mg by mouth Daily (before breakfast).  multivitamin (ONE A DAY) tablet Take 1 Tab by mouth daily. Indications: 3 gummys bid  zolpidem (AMBIEN) 10 mg tablet Take 10 mg by mouth nightly.  DULoxetine (CYMBALTA) 60 mg capsule Take 60 mg by mouth daily. Indications: NEUROPATHIC PAIN    
 simvastatin (ZOCOR) 20 mg tablet Take 20 mg by mouth daily.  levothyroxine (SYNTHROID) 100 mcg tablet Take 100 mcg by mouth daily.  VITAMIN E ACETATE (VITAMIN E PO) Take 400 Units by mouth daily.  KRILL OIL PO Take 75 mg by mouth daily.  promethazine (PHENERGAN) 25 mg tablet Take 25 mg by mouth daily as needed for Nausea.  B.infantis-B.ani-B.long-B.bifi (PROBIOTIC 4X) 10-15 mg TbEC Take 3 Caps by mouth nightly.  cranberry extract 450 mg tab Take 450 mg by mouth daily.  furosemide (LASIX) 40 mg tablet Take 40 mg by mouth daily.  Cholecalciferol, Vitamin D3, 5,000 unit Tab Take 5,000 Units by mouth nightly. Past History Past Medical History: 
Past Medical History:  
Diagnosis Date  Adverse effect of anesthesia   
 versed makes her vomiting for two days  Bronchitis  Chronic pain   
 arthritis & previous surgeries  Diverticulitis  History of blood transfusion x2  Hypoglycemia  Hypothyroid  Intestinal malabsorption  Osteoporosis  Other ill-defined conditions(799.89) bronchitis 2 weeks ago  Other ill-defined conditions(799.89)   
 collapsed pelvis on left side  Pelvic obliquity  Smoking history   
 quit in 1969  Status post gastric banding 03/2010  
 band over prior gastric bypass / Torres via Yoni Past Surgical History: 
Past Surgical History: Procedure Laterality Date  BREAST SURGERY PROCEDURE UNLISTED 3 partial lumpectomies, left and right  COLONOSCOPY N/A 2016 COLONOSCOPY w/ polypectomies performed by Vicky Franco MD at [address] [facility]  HX GASTRIC BYPASS    HX GASTRIC BYPASS  2009  
 lap band  HX HEENT    
 tonsillectomy and adenoidectomy  HX HEENT  2018  
 cataract left  HX HYSTERECTOMY  HX KNEE REPLACEMENT    
 bilateral knee  HX ORTHOPAEDIC    
 left hip replacement x 2,  
 HX SHOULDER REPLACEMENT    
 left  HX TONSIL AND ADENOIDECTOMY Family History: 
Family History Problem Relation Age of Onset  Cancer Other  Diabetes Other  Colon Cancer Mother Social History: 
Social History Tobacco Use  Smoking status: Former Smoker Packs/day: 2.00 Years: 8.00 Pack years: 16.00 Last attempt to quit: 1969 Years since quittin.5  Smokeless tobacco: Never Used Substance Use Topics  Alcohol use: Yes Alcohol/week: 1.0 oz Types: 2 Standard drinks or equivalent per week Comment: social  
 Drug use: No  
 
 
Allergies: Allergies Allergen Reactions  Ciprofibrate Anaphylaxis  Levaquin [Levofloxacin] Anaphylaxis  Sulfa (Sulfonamide Antibiotics) Anaphylaxis and Rash  Codeine Nausea and Vomiting  Versed [Midazolam] Nausea and Vomiting Review of Systems Review of Systems Constitutional: Positive for appetite change (decreased) and fever (subjective). (+) muscle spasms HENT:  
     (+) pills stuck in throat Respiratory: Positive for cough (productive, yellow phlegm). Negative for shortness of breath. Gastrointestinal: Positive for vomiting (forced by pt). Negative for abdominal pain. Neurological: Positive for headaches. All other systems reviewed and are negative. Physical Exam  
 
Vitals:  
 19 1616 19 1727 BP: (!) 151/92 Pulse: 80 Resp: 20   
 Temp: 98.4 °F (36.9 °C) SpO2: 100% 100% Weight: 83.9 kg (185 lb) Height: 5' 8\" (1.727 m) Physical Exam  
Constitutional: She is oriented to person, place, and time. She appears well-developed and well-nourished. No distress. Alert, non toxic, rapid/pressure, speech, flight of ideas, agitated affect HENT:  
Head: Normocephalic and atraumatic. Neck: Normal range of motion. Neck supple. Cardiovascular: Normal rate, regular rhythm, normal heart sounds and intact distal pulses. No murmur heard. Pulmonary/Chest: Effort normal and breath sounds normal. No respiratory distress. She has no wheezes. She has no rales. Lungs CTA-B, no stridor, good air movement Abdominal: Soft. Bowel sounds are normal. She exhibits no distension. There is no tenderness. There is no rebound and no guarding. Neurological: She is alert and oriented to person, place, and time. Skin: Skin is warm and dry. Psychiatric: Judgment and thought content normal. Her mood appears anxious. Her speech is rapid and/or pressured. She is agitated. Cognition and memory are normal.  
Nursing note and vitals reviewed. Diagnostic Study Results Labs - No results found for this or any previous visit (from the past 12 hour(s)). Radiologic Studies - No orders to display Medications given in the ED- Medications - No data to display Medical Decision Making I am the first provider for this patient. I reviewed the vital signs, available nursing notes, past medical history, past surgical history, family history and social history. Vital Signs-Reviewed the patient's vital signs. Pulse Oximetry Analysis - 100% on RA Records Reviewed: Nursing Notes and Old Medical Records Procedures: 
Procedures ED Course:  
5:27 PM Initial assessment performed.  The patients presenting problems have been discussed, and they are in agreement with the care plan formulated and outlined with them. I have encouraged them to ask questions as they arise throughout their visit. I informed the pt that She needed work up, further lab evaluation for adequate evaluation for her symptoms, that by refusing lab work, workup She is at risk for potentially life threatening cause of her pain, including but not limited to MI, esophageal obstruction, intra-abdominal infection, potential surgical emergency, and death. She is awake, alert, She understands her condition and the risks involved in leaving. When having this conversation and is clinically aware of their surroundings and able to ask appropriate questions, the patients and the nurse present confirms She is not clinically intoxicated and able to make medical decisions. She verbalized knowing the same risks and understood it was recommended that She stay and could also return at any time. Pt was present for the discussion and also verbalized that She understood both diagnosis, risks and could return at any time. They were both provided with warnings regarding worsening of her condition and were provided instructions to follow up with Dwain Sears MD tomorrow or return to the Emergency Room as soon as possible. This discussion was witnessed by Michael Montero RN. Discussion: Pt with hx of gastric bypass presents with foreign body sensation and spasms in her chest since taking her morning medications. She's had no relief with attempting to make herself vomit or by drinking liquids. She has no difficulty breathing. Her lung sounds were clear and equal bilaterally. She is without stridor. Explained we could give her some medications and check some lab work, XR, and EKG to r/o any potential harmful causes and to try to relieve her sx. Pt states she spoke with Dr. Baltazar Salas office and was told to come to ED and that he would be notified when she arrived.  She became upset that Dr. Chris Gallardo had not been called by registration/triage upon her arrival and that she had been waiting for an hour, at which point she said that she was told he would come and see her in the ER and that the office would be closed now and he wont come to see her since she waited so long. Explained that registration is not able to call Dr. Nik Worthington and his office could be only be contacted after she had been assessed by a provider. Even if the office is closed, there's always somebody on call for Dr. Nik Worthington that we could get in touch with once her work up was complete. Pt became angry said that she no longer wanted to be seen. I explained that we couldn't r/o any life threatening conditions or the cause of her sx. See informed refusal note. She understands and said she'd be leaving now and did not want to wait for her d/c papers. Diagnosis and Disposition DISCHARGE NOTE: 
5:42 PM 
Negra Huertas  results have been reviewed with her. She has been counseled regarding her diagnosis, treatment, and plan. She verbally conveys understanding and agreement of the signs, symptoms, diagnosis, treatment and prognosis and additionally agrees to follow up as discussed. She also agrees with the care-plan and conveys that all of her questions have been answered. I have also provided discharge instructions for her that include: educational information regarding their diagnosis and treatment, and list of reasons why they would want to return to the ED prior to their follow-up appointment, should her condition change. She has been provided with education for proper emergency department utilization. CLINICAL IMPRESSION: 
 
1. Sensation of foreign body in esophagus 2. History of chronic pain PLAN: 
1. D/C Home 2. Current Discharge Medication List  
  
 
3. Follow-up Information Follow up With Specialties Details Why Contact Info Casandra Guillory MD General Surgery  call for follow up  [address] 
[phone] [facility] EMERGENCY DEPT Emergency Medicine  If symptoms worsen [address] [id number] 
[phone]  
  
 
_______________________________ Attestations: This note is prepared by Mandi Muller, acting as Scribe for David Wilson. Leah Beal PA-C:  The scribe's documentation has been prepared under my direction and personally reviewed by me in its entirety. I confirm that the note above accurately reflects all work, treatment, procedures, and medical decision making performed by me. 
_______________________________

## 2019-02-20 NOTE — ED NOTES
Patient yelling at [facility] during exam, patient states she is leaving, patient left without discharge paperwork

## 2019-02-20 NOTE — ED TRIAGE NOTES
Patient states that she feels like she has pills stuck. Patient states that she can not get the pills \"unstuck\"

## 2019-07-31 ENCOUNTER — APPOINTMENT (OUTPATIENT)
Dept: GENERAL RADIOLOGY | Age: 69
End: 2019-07-31
Attending: SPECIALIST
Payer: MEDICARE

## 2019-07-31 ENCOUNTER — HOSPITAL ENCOUNTER (OUTPATIENT)
Age: 69
Setting detail: OUTPATIENT SURGERY
Discharge: HOME | End: 2019-07-31
Attending: SPECIALIST | Admitting: SPECIALIST
Payer: MEDICARE

## 2019-07-31 VITALS
HEART RATE: 67 BPM | DIASTOLIC BLOOD PRESSURE: 70 MMHG | OXYGEN SATURATION: 99 % | HEIGHT: 68 IN | SYSTOLIC BLOOD PRESSURE: 117 MMHG | WEIGHT: 192 LBS | BODY MASS INDEX: 29.1 KG/M2 | TEMPERATURE: 92 F

## 2019-07-31 DIAGNOSIS — E66.01 MORBID OBESITY (HCC): ICD-10-CM

## 2019-07-31 PROCEDURE — 74011000255 HC RX REV CODE- 255: Performed by: SPECIALIST

## 2019-07-31 PROCEDURE — 74240 X-RAY XM UPR GI TRC 1CNTRST: CPT

## 2019-07-31 PROCEDURE — 43999 UNLISTED PROCEDURE STOMACH: CPT | Performed by: SPECIALIST

## 2019-08-01 NOTE — OP NOTES
[facility]  OPERATIVE RE[facility]    Name:  Bernabe Vick  MR#:   [id number]  :  1950  ACCOUNT #:  [de-identified]  DATE OF SERVICE:  2019    PREOPERATIVE DIAGNOSIS:  The patient status post Lap Band placement by another surgeon of her prior gastric bypass procedure with partial obstructive symptoms. POSTOPERATIVE DIAGNOSIS:  The patient status post Lap Band placement by another surgeon of her prior gastric bypass procedure with partial obstructive symptoms with normal upper gastrointestinal study. PROCEDURE PERFORMED:  Upper gastrointestinal study with barium. SURGEON:  Maddi Roberts. Abdoul Hoffman MD    ASSISTANT:  None. ANESTHESIA:  none    COMPLICATIONS:  None. SPECIMENS REMOVED:  None. INFUSION:  None. IMPLANTS:  None. ESTIMATED BLOOD LOSS:  None. STATEMENT OF MEDICAL NECESSITY:  The patient is a 78-year-old female who had Lap-Band placed by another surgeon in [address] [place] [address] over a prior gastric bypass procedure. She has done well with the weight loss, was doing fine until she went on a trip to [place], where she drank relatively significantly on one night, then began to have obstructive symptoms thereafter. Her symptoms had gotten minimally better, and she is here today for upper GI study to assess her Lap-Band. PROCEDURE:  The patient was brought to the fluoro unit where she was given thin barium. On swallowing barium, she was noted to have normal peristalsis of her esophagus. She had prompt filling of the distal esophagus with tapering into and through the gastroesophageal junction. Contrast then filled the gastric pouch which was of normal caliber and dimension. The gastric band was oriented perfectly at 45-degree angle. There was steady flow of contrast through the band, although the band did have relatively significant tightness.   At this juncture, there was no real obstruction at the level of the band since the patient's symptoms are improving, she would rather not remove fluid from her band if possible. We are going to simply let her see if her symptoms continue to improve; if they do not, she will re-present for some fluid to be removed.       Yancy Gil MD      [id number]  D:  07/31/2019 19:48  T:  08/01/2019 0:16  JOB #:  [id number]

## 2021-03-29 ENCOUNTER — OFFICE VISIT (OUTPATIENT)
Dept: SURGERY | Age: 71
End: 2021-03-29
Payer: MEDICARE

## 2021-03-29 VITALS
HEART RATE: 66 BPM | OXYGEN SATURATION: 100 % | TEMPERATURE: 97.6 F | DIASTOLIC BLOOD PRESSURE: 80 MMHG | SYSTOLIC BLOOD PRESSURE: 118 MMHG | WEIGHT: 211.56 LBS | HEIGHT: 68 IN | BODY MASS INDEX: 32.06 KG/M2 | RESPIRATION RATE: 16 BRPM

## 2021-03-29 DIAGNOSIS — Z98.84 STATUS POST GASTRIC BANDING: ICD-10-CM

## 2021-03-29 DIAGNOSIS — R11.10 REGURGITATION OF FOOD: Primary | ICD-10-CM

## 2021-03-29 DIAGNOSIS — K90.89 OTHER SPECIFIED INTESTINAL MALABSORPTION: ICD-10-CM

## 2021-03-29 DIAGNOSIS — Z98.84 STATUS POST GASTRIC BYPASS FOR OBESITY: ICD-10-CM

## 2021-03-29 PROCEDURE — G0463 HOSPITAL OUTPT CLINIC VISIT: HCPCS | Performed by: SPECIALIST

## 2021-03-29 PROCEDURE — G8536 NO DOC ELDER MAL SCRN: HCPCS | Performed by: SPECIALIST

## 2021-03-29 PROCEDURE — G8510 SCR DEP NEG, NO PLAN REQD: HCPCS | Performed by: SPECIALIST

## 2021-03-29 PROCEDURE — G8427 DOCREV CUR MEDS BY ELIG CLIN: HCPCS | Performed by: SPECIALIST

## 2021-03-29 PROCEDURE — 99214 OFFICE O/P EST MOD 30 MIN: CPT | Performed by: SPECIALIST

## 2021-03-29 PROCEDURE — 1090F PRES/ABSN URINE INCON ASSESS: CPT | Performed by: SPECIALIST

## 2021-03-29 PROCEDURE — 1101F PT FALLS ASSESS-DOCD LE1/YR: CPT | Performed by: SPECIALIST

## 2021-03-29 PROCEDURE — 3017F COLORECTAL CA SCREEN DOC REV: CPT | Performed by: SPECIALIST

## 2021-03-29 PROCEDURE — G8417 CALC BMI ABV UP PARAM F/U: HCPCS | Performed by: SPECIALIST

## 2021-03-29 PROCEDURE — G8400 PT W/DXA NO RESULTS DOC: HCPCS | Performed by: SPECIALIST

## 2021-03-29 RX ORDER — HYOSCYAMINE SULFATE 0.12 MG/1
0.12 TABLET SUBLINGUAL
COMMUNITY
Start: 2021-01-05

## 2021-03-29 RX ORDER — DOXEPIN HYDROCHLORIDE 25 MG/1
25 CAPSULE ORAL AT BEDTIME
COMMUNITY
Start: 2020-12-03

## 2021-03-29 NOTE — PROGRESS NOTES
Subjective:     Raheem Montaño  is a 65 y.o. female who presents for follow-up about  18 years following VBG gastric bypass and 13 years out from band over bypass by Dr Trang Rea . She has lost a total of 189 pounds since surgery. Body mass index is 32.17 kg/m². The patient presents today to assess their progress toward their goal of weight loss and to address any issues that may be present. Today the patient and I have reviewed their diet and how appropriate their food choices are. The following issues have been identified - none from a surgical standoint. Weight Loss Metrics 3/29/2021 7/31/2019 2/20/2019 7/6/2018 4/18/2018 4/6/2018 3/21/2018   Pre op / Initial Wt - - - - - - -   Today's Wt 211 lb 9 oz 192 lb 185 lb 186 lb 4.8 oz 185 lb 7 oz 180 lb 186 lb 3 oz   BMI 32.17 kg/m2 29.19 kg/m2 28.13 kg/m2 28.33 kg/m2 28.2 kg/m2 27.37 kg/m2 28.31 kg/m2   Ideal Body Wt - - - - - - -       Surgery related complication: NA       She reports rash, itching, dry skin, dry mouth, dry eyes. Told idiopathic urticaria from specialists. Recently, has had a sensation something pushing or squeezing in chest/epigastric area. She denies vomiting, abdominal pain, diarrhea and reflux. The patients diet choices have been reviewed today and counseling was given. Can't tolerate vegetables, stopped eating meat because \"it gets stuck and doesn't go down\". Last UGI done July 2019 - no adjustment    Last fill 8/23/17      Previous Fill Volume:     Removed:       Total fill volume after today's adjustment:    Added:         pt came to use with unknown amount of fluid in band - at this point she has had 1.25 cc added via this office over 2 separate adjustments           Patients pain score:0/10    Weight Loss Metrics 3/29/2021 7/31/2019 2/20/2019 7/6/2018 4/18/2018 4/6/2018 3/21/2018   Pre op / Initial Wt - - - - - - -   Today's Wt 211 lb 9 oz 192 lb 185 lb 186 lb 4.8 oz 185 lb 7 oz 180 lb 186 lb 3 oz   BMI 32.17 kg/m2 29.19 kg/m2 28.13 kg/m2 28.33 kg/m2 28.2 kg/m2 27.37 kg/m2 28.31 kg/m2   Ideal Body Wt - - - - - - -        The patient's exercise level: moderately active. Changes in her medical history and medications have been reviewed. Patient Active Problem List   Diagnosis Code    Diverticular disease K57.90    Colon polyp K63.5    Status post gastric bypass for obesity Z98.84    Status post gastric banding Z98.84    Intestinal malabsorption K90.9    Smoking history Z87.891    Cataract H26.9     Past Medical History:   Diagnosis Date    Adverse effect of anesthesia     versed makes her vomiting for two days    Bronchitis     Chronic pain     arthritis & previous surgeries    Diverticulitis     History of blood transfusion     x2    Hypoglycemia     Hypothyroid     Intestinal malabsorption     Osteoporosis     Other ill-defined conditions(799.89)     bronchitis 2 weeks ago    Other ill-defined conditions(799.89)     collapsed pelvis on left side    Pelvic obliquity     Smoking history     quit in 1969    Status post gastric banding 03/2010    band over prior gastric bypass / [facility] [place]     Past Surgical History:   Procedure Laterality Date    COLONOSCOPY N/A 12/16/2016    COLONOSCOPY w/ polypectomies performed by Salvador Pierre MD at [place] [id number] [facility] GASTRIC BYPASS  2003    HX GASTRIC BYPASS  2009    lap band    HX HEENT      tonsillectomy and adenoidectomy    HX HEENT  2018    cataract left    HX HYSTERECTOMY      HX KNEE REPLACEMENT      bilateral knee    HX ORTHOPAEDIC      left hip replacement x 2,    HX SHOULDER REPLACEMENT      left    HX TONSIL AND ADENOIDECTOMY      DC BREAST SURGERY PROCEDURE UNLISTED      3 partial lumpectomies, left and right     Current Outpatient Medications   Medication Sig Dispense Refill    hyoscyamine SL (LEVSIN/SL) 0.125 mg SL tablet 0.125 mg by SubLINGual route every four (4) hours as needed.       doxepin (SINEquan) 25 mg capsule Take 25 mg by mouth At bedtime.  metoprolol succinate (TOPROL-XL) 50 mg XL tablet TAKE ONE TABLET BY MOUTH ONCE EVERY DAY      promethazine (PHENERGAN) 25 mg tablet Take 25 mg by mouth daily as needed for Nausea.  B.infantis-B.ani-B.long-B.bifi (PROBIOTIC 4X) 10-15 mg TbEC Take 3 Caps by mouth nightly.  LORazepam (ATIVAN) 0.5 mg tablet Take 0.5 mg by mouth every four (4) hours as needed.  cranberry extract 450 mg tab Take 450 mg by mouth daily.  ferrous sulfate 325 mg (65 mg iron) tablet Take 325 mg by mouth Daily (before breakfast).  multivitamin (ONE A DAY) tablet Take 1 Tab by mouth daily. Indications: 3 gummys bid      zolpidem (AMBIEN) 10 mg tablet Take 10 mg by mouth nightly.  DULoxetine (CYMBALTA) 60 mg capsule Take 60 mg by mouth daily. Indications: NEUROPATHIC PAIN      simvastatin (ZOCOR) 20 mg tablet Take 20 mg by mouth daily.  levothyroxine (SYNTHROID) 100 mcg tablet Take 100 mcg by mouth daily.  Cholecalciferol, Vitamin D3, 5,000 unit Tab Take 5,000 Units by mouth nightly.  KRILL OIL PO Take 75 mg by mouth daily.  furosemide (LASIX) 40 mg tablet Take 40 mg by mouth daily.  VITAMIN E ACETATE (VITAMIN E PO) Take 400 Units by mouth daily.           Review of Symptoms:       General - No history or complaints of unexpected fever or chills  Head/Neck - No history or complaints of headache or dizziness  Cardiac - No history or complaints of chest pain, palpitations, or shortness of breath  Pulmonary - No history or complaints of shortness of breath or productive cough  Gastrointestinal - as noted above  Genitourinary - No history or complaints of hematuria/dysuria or renal lithiasis  Musculoskeletal - No history or complaints of joint  muscular weakness  Hematologic - No history of any bleeding episodes  Neurologic - No history or complaints of  migraine headaches or neurologic symptoms                     Objective:     Visit Vitals  BP 118/80 (BP 1 Location: Right arm, BP Patient Position: Sitting, BP Cuff Size: Adult)   Pulse 66   Temp 97.6 °F (36.4 °C)   Resp 16   Ht 5' 8\" (1.727 m)   Wt 96 kg (211 lb 9 oz)   SpO2 100%   BMI 32.17 kg/m²        Physical Exam:        General appearance:  alert, cooperative, no distress, appears stated age   Mental status   alert, oriented to person, place, and time, normal mood, behavior, speech, dress, motor activity, and thought processes   Neck  supple, no significant adenopathy     Lymphatics  no palpable lymphadenopathy, no hepatosplenomegaly   Chest  clear to auscultation, no wheezes, rales or rhonchi, symmetric air entry   Heart  normal rate, regular rhythm, normal S1, S2, no murmurs, rubs, clicks or gallops    Abdomen: soft, nontender, nondistended, no masses or organomegaly   Incision:  healing well, no drainage, no erythema, no hernia, no seroma, no swelling, no dehiscence, incision well approximated      Neurological  alert, oriented, normal speech, no focal findings or movement disorder noted   Musculoskeletal no joint tenderness, deformity or swelling   Extremities peripheral pulses normal, no pedal edema, no clubbing or cyanosis   Skin normal coloration and turgor, no rashes, no suspicious skin lesions noted      Lab Results   Component Value Date/Time    WBC 6.4 06/28/2017 10:02 AM    Hemoglobin, POC 12.6 12/16/2016 09:04 AM    HGB 11.5 (L) 06/28/2017 10:02 AM    Hematocrit, POC 37 12/16/2016 09:04 AM    HCT 34.6 (L) 06/28/2017 10:02 AM    PLATELET 323 55/42/1134 10:02 AM    MCV 93.8 06/28/2017 10:02 AM     Lab Results   Component Value Date/Time    Sodium 144 06/28/2017 10:02 AM    Potassium 3.8 06/28/2017 10:02 AM    Chloride 106 06/28/2017 10:02 AM    CO2 29 06/28/2017 10:02 AM    Anion gap 9 06/28/2017 10:02 AM    Glucose 107 (H) 06/28/2017 10:02 AM    BUN 25 (H) 06/28/2017 10:02 AM    Creatinine 1.40 (H) 06/28/2017 10:02 AM    BUN/Creatinine ratio 18 06/28/2017 10:02 AM    GFR est AA 45 (L) 06/28/2017 10:02 AM    GFR est non-AA 37 (L) 06/28/2017 10:02 AM    Calcium 9.4 06/28/2017 10:02 AM    Bilirubin, total 0.4 06/28/2017 10:02 AM    Alk. phosphatase 63 06/28/2017 10:02 AM    Protein, total 6.2 (L) 06/28/2017 10:02 AM    Albumin 3.4 06/28/2017 10:02 AM    Globulin 2.8 06/28/2017 10:02 AM    A-G Ratio 1.2 06/28/2017 10:02 AM    ALT (SGPT) 13 06/28/2017 10:02 AM     Lab Results   Component Value Date/Time    Iron 107 06/28/2017 10:02 AM    Ferritin 305 06/28/2017 10:02 AM     Lab Results   Component Value Date/Time    Folate >20.0 (H) 06/28/2017 10:02 AM     No results found for: VITD3, Abraham Idale, XQVID, VD3RIA        Assessment:     1. History of Morbid obesity, status post  VBG/bypass and band over bypass. The patient sounds too tight. Plan:       Will schedule for UGI in fill clinic for likely loosening

## 2021-03-29 NOTE — PATIENT INSTRUCTIONS
Patient Instructions 1. Continue to monitor carbohydrate and protein intake- remember to keep your           total  carbohydrates to 50 grams or less per day for best results. 2. Remember hydration goals - usually 48 to 64 ounces of liquids per day 3. Continue to work towards exercise goals - minimum 3 days per week of 45          minutes to  1 hour at a time. 4. Remember to take vitamins as directed Supplement Resource Guide Importance of Protein:  
Maintains lean body mass, produces antibodies to fight off infections, heals wounds, minimizes hair loss, helps to give you energy, helps with satiety, and keeping you full between meals. Importance of Calcium: 
Needed for healthy bones and teeth, normal blood clotting, and nervous system functioning, higher risk of osteoporosis and bone disease with non-compliance. Importance of Multivitamins: Many functions. Supply you with extra nutrients that you may be missing from food. May lead to iron deficiency anemia, weakness, fatigue, and many other symptoms with non-compliance. Importance of B Vitamins: 
Important for red blood cell formation, metabolism, energy, and helps to maintain a healthy nervous system. Protein Supplement Find one you like now. Use immediately after surgery. Look for: 
35-50g protein each day from your protein supplement once you reach the progression diet. 0-3 g fat per serving 0-3 g sugar per serving Protein drinks should be split in separate dosages. Recommend: Lifelong 1 year + Calcium Supplement:  
 
Start taking within a month after surgery. Look for: Calcium Citrate Plus D (1500 mg per day) Recommend: Citracal 
 
 . Avoid chocolate chewable calcium. Can use chewable bariatric or [employer] brand or similar chewable. The body cannot absorb more than 500-600 mg @ a time.  
 
 
Take for Life Multi-vitamin Supplement:   
 
1st Month After Surgery: Any complete chewable, such as: [place]s Complete chewables. Avoid [place] sours or gummies. They lack iron and other important nutrients and also have added sugar. Continue with chewable vitamin or change to adult complete multivitamin one month after surgery. Menstruating women can take a prenatal vitamin. Make sure has at least 18 mg iron and 857-312 mcg folic acid): Vitamin B12, B Complex Vitamin, and Biotin Start taking within a month after surgery. Vitamin B12:  1000 mcg of Vitamin B12 three times weekly Must take sublingually (meaning you take it under your tongue) or in a liquid drop form for easy absorption. B Complex Vitamin: Take a pill or liquid drop form once daily. Biotin: This vitamin can help prevent hair loss. Recommend 5mg  
(5000 mcg) a day Biotin is Optional

## 2021-04-07 ENCOUNTER — APPOINTMENT (OUTPATIENT)
Dept: SURGERY | Age: 71
End: 2021-04-07

## 2021-04-07 ENCOUNTER — HOSPITAL ENCOUNTER (OUTPATIENT)
Age: 71
Setting detail: OUTPATIENT SURGERY
Discharge: HOME | End: 2021-04-07
Attending: SPECIALIST | Admitting: SPECIALIST
Payer: MEDICARE

## 2021-04-07 ENCOUNTER — APPOINTMENT (OUTPATIENT)
Dept: GENERAL RADIOLOGY | Age: 71
End: 2021-04-07
Attending: SPECIALIST
Payer: MEDICARE

## 2021-04-07 VITALS
DIASTOLIC BLOOD PRESSURE: 84 MMHG | SYSTOLIC BLOOD PRESSURE: 147 MMHG | HEART RATE: 65 BPM | BODY MASS INDEX: 32.18 KG/M2 | OXYGEN SATURATION: 100 % | HEIGHT: 68 IN | RESPIRATION RATE: 15 BRPM | WEIGHT: 212.31 LBS | TEMPERATURE: 97.3 F

## 2021-04-07 DIAGNOSIS — Z46.51 FITTING AND ADJUSTMENT OF GASTRIC LAP BAND: ICD-10-CM

## 2021-04-07 DIAGNOSIS — K21.9 GASTROESOPHAGEAL REFLUX DISEASE, UNSPECIFIED WHETHER ESOPHAGITIS PRESENT: ICD-10-CM

## 2021-04-07 DIAGNOSIS — R11.10 VOMITING, INTRACTABILITY OF VOMITING NOT SPECIFIED, PRESENCE OF NAUSEA NOT SPECIFIED, UNSPECIFIED VOMITING TYPE: ICD-10-CM

## 2021-04-07 LAB
25(OH)D3 SERPL-MCNC: 71.2 NG/ML (ref 30–100)
ALBUMIN SERPL-MCNC: 3.7 G/DL (ref 3.4–5)
ALBUMIN/GLOB SERPL: 1.2 {RATIO} (ref 0.8–1.7)
ALP SERPL-CCNC: 81 U/L (ref 45–117)
ALT SERPL-CCNC: 84 U/L (ref 13–56)
ANION GAP SERPL CALC-SCNC: 5 MMOL/L (ref 3–18)
AST SERPL-CCNC: 51 U/L (ref 10–38)
BASOPHILS # BLD: 0 K/UL (ref 0–0.1)
BASOPHILS NFR BLD: 1 % (ref 0–2)
BILIRUB SERPL-MCNC: 0.5 MG/DL (ref 0.2–1)
BUN SERPL-MCNC: 20 MG/DL (ref 7–18)
BUN/CREAT SERPL: 27 (ref 12–20)
CALCIUM SERPL-MCNC: 8.5 MG/DL (ref 8.5–10.1)
CHLORIDE SERPL-SCNC: 109 MMOL/L (ref 100–111)
CO2 SERPL-SCNC: 28 MMOL/L (ref 21–32)
CREAT SERPL-MCNC: 0.75 MG/DL (ref 0.6–1.3)
DIFFERENTIAL METHOD BLD: ABNORMAL
EOSINOPHIL # BLD: 0.3 K/UL (ref 0–0.4)
EOSINOPHIL NFR BLD: 9 % (ref 0–5)
ERYTHROCYTE [DISTWIDTH] IN BLOOD BY AUTOMATED COUNT: 13.2 % (ref 11.6–14.5)
FERRITIN SERPL-MCNC: 593 NG/ML (ref 8–388)
FOLATE SERPL-MCNC: >20 NG/ML (ref 3.1–17.5)
GLOBULIN SER CALC-MCNC: 3 G/DL (ref 2–4)
GLUCOSE SERPL-MCNC: 113 MG/DL (ref 74–99)
HCT VFR BLD AUTO: 38.1 % (ref 35–45)
HGB BLD-MCNC: 11.9 G/DL (ref 12–16)
IRON SERPL-MCNC: 104 UG/DL (ref 50–175)
LYMPHOCYTES # BLD: 1.5 K/UL (ref 0.9–3.6)
LYMPHOCYTES NFR BLD: 55 % (ref 21–52)
MCH RBC QN AUTO: 33.1 PG (ref 24–34)
MCHC RBC AUTO-ENTMCNC: 31.2 G/DL (ref 31–37)
MCV RBC AUTO: 106.1 FL (ref 74–97)
MONOCYTES # BLD: 0.3 K/UL (ref 0.05–1.2)
MONOCYTES NFR BLD: 12 % (ref 3–10)
NEUTS SEG # BLD: 0.6 K/UL (ref 1.8–8)
NEUTS SEG NFR BLD: 23 % (ref 40–73)
PLATELET # BLD AUTO: 232 K/UL (ref 135–420)
PMV BLD AUTO: 10 FL (ref 9.2–11.8)
POTASSIUM SERPL-SCNC: 4.5 MMOL/L (ref 3.5–5.5)
PROT SERPL-MCNC: 6.7 G/DL (ref 6.4–8.2)
RBC # BLD AUTO: 3.59 M/UL (ref 4.2–5.3)
SODIUM SERPL-SCNC: 142 MMOL/L (ref 136–145)
VIT B12 SERPL-MCNC: 663 PG/ML (ref 211–911)
WBC # BLD AUTO: 2.8 K/UL (ref 4.6–13.2)

## 2021-04-07 PROCEDURE — 82306 VITAMIN D 25 HYDROXY: CPT

## 2021-04-07 PROCEDURE — 83540 ASSAY OF IRON: CPT

## 2021-04-07 PROCEDURE — 85025 COMPLETE CBC W/AUTO DIFF WBC: CPT

## 2021-04-07 PROCEDURE — 82607 VITAMIN B-12: CPT

## 2021-04-07 PROCEDURE — 76000 FLUOROSCOPY <1 HR PHYS/QHP: CPT | Performed by: SPECIALIST

## 2021-04-07 PROCEDURE — 82746 ASSAY OF FOLIC ACID SERUM: CPT

## 2021-04-07 PROCEDURE — 36415 COLL VENOUS BLD VENIPUNCTURE: CPT

## 2021-04-07 PROCEDURE — 43999 UNLISTED PROCEDURE STOMACH: CPT | Performed by: SPECIALIST

## 2021-04-07 PROCEDURE — 80053 COMPREHEN METABOLIC PANEL: CPT

## 2021-04-07 PROCEDURE — 76000 FLUOROSCOPY <1 HR PHYS/QHP: CPT

## 2021-04-07 PROCEDURE — 74011000250 HC RX REV CODE- 250: Performed by: SPECIALIST

## 2021-04-07 PROCEDURE — 82728 ASSAY OF FERRITIN: CPT

## 2021-04-07 RX ORDER — LIDOCAINE HYDROCHLORIDE 10 MG/ML
INJECTION INFILTRATION; PERINEURAL AS NEEDED
Status: DISCONTINUED | OUTPATIENT
Start: 2021-04-07 | End: 2021-04-07 | Stop reason: HOSPADM

## 2021-04-07 NOTE — PROCEDURES
Lap Band Encounter (fluroscopy clinic)    Stephanie Booth is gastric banding patient who had her procedure on  .  her weight today is 96.3 kg (212 lb 5 oz), which correlates to  % EBW loss. she is here today for Lap Band Adjustment / Fill with Fluoroscopy Guidance. she notes the following issues related to the banding procedure; - here with obstructive type issues.       Surgery related complications; complex history     Visit Vitals  BP (!) 147/84   Pulse 65   Temp 97.3 °F (36.3 °C)   Resp 15   Ht 5' 8\" (1.727 m)   Wt 96.3 kg (212 lb 5 oz)   SpO2 100%   BMI 32.28 kg/m²       Past Medical History:   Diagnosis Date    Adverse effect of anesthesia     versed makes her vomiting for two days    Bronchitis     Chronic pain     arthritis & previous surgeries    Diverticulitis     History of blood transfusion     x2    Hypoglycemia     Hypothyroid     Intestinal malabsorption     Osteoporosis     Other ill-defined conditions(799.89)     bronchitis 2 weeks ago    Other ill-defined conditions(799.89)     collapsed pelvis on left side    Pelvic obliquity     Smoking history     quit in 1969    Status post gastric banding 03/2010    band over prior gastric bypass / [facility]     Past Surgical History:   Procedure Laterality Date    COLONOSCOPY N/A 12/16/2016    COLONOSCOPY w/ polypectomies performed by Charissa Rojas MD at [address] HX CHOLECYSTECTOMY      [id number] GASTRIC BYPASS  2003    HX GASTRIC BYPASS  2009    lap band    HX HEENT      tonsillectomy and adenoidectomy    HX HEENT  2018    cataract left    HX HYSTERECTOMY      HX KNEE REPLACEMENT      bilateral knee    HX ORTHOPAEDIC      left hip replacement x 2,    HX SHOULDER REPLACEMENT      left    HX TONSIL AND ADENOIDECTOMY      FL BREAST SURGERY PROCEDURE UNLISTED      3 partial lumpectomies, left and right     Current Facility-Administered Medications   Medication Dose Route Frequency Provider Last Rate Last Admin    barium sulfate (EZ PAQUE) 60 % (w/v) contrast susp    PRN Agnes Herrera MD   100 mL at 04/07/21 1333    lidocaine (XYLOCAINE) 10 mg/mL (1 %) injection    PRN Agnes Herrera MD   1.5 mL at 04/07/21 1333          Review of Symptoms:     General - No history or complaints of unexpected fever or chills  Cardiac - No history or complaints of chest pain, palpitations, or shortness of breath  Pulmonary - No history or complaints of shortness of breath or productive cough  Gastrointestinal - as noted above        Physical Exam:    General:  alert, cooperative, no distress, appears stated age   Abdomen:   abdomen is soft without significant tenderness, masses, organomegaly or guarding; port in place   Incisions: healing well, no significant drainage       Assessment:     1. History of Morbid obesity, status post gastric banding, given the fluro findings of an overly tight band we will proceed with the following adjustment. Plan:     Previous Fill Volume:     Removed:       Total fill volume after today's adjustment:    Added:           0.6 cc removed from band       Follow-up in PRN

## 2021-04-08 DIAGNOSIS — D72.819 LEUKOPENIA, UNSPECIFIED TYPE: Primary | ICD-10-CM

## 2021-07-16 ENCOUNTER — TRANSCRIBE ORDER (OUTPATIENT)
Dept: SCHEDULING | Age: 71
End: 2021-07-16

## 2021-07-16 DIAGNOSIS — M54.50 LUMBAGO: Primary | ICD-10-CM

## 2021-07-16 DIAGNOSIS — R10.2 PERINEAL NEURALGIA: ICD-10-CM

## 2021-08-17 ENCOUNTER — TRANSCRIBE ORDER (OUTPATIENT)
Dept: SCHEDULING | Age: 71
End: 2021-08-17

## 2021-08-17 DIAGNOSIS — D41.02 NEOPLASM OF UNCERTAIN BEHAVIOR OF LEFT KIDNEY: Primary | ICD-10-CM

## 2021-08-20 ENCOUNTER — HOSPITAL ENCOUNTER (OUTPATIENT)
Dept: CT IMAGING | Age: 71
Discharge: HOME | End: 2021-08-20
Attending: UROLOGY
Payer: MEDICARE

## 2021-08-20 DIAGNOSIS — D41.02 NEOPLASM OF UNCERTAIN BEHAVIOR OF LEFT KIDNEY: ICD-10-CM

## 2021-08-20 LAB — CREAT UR-MCNC: 0.7 MG/DL (ref 0.6–1.3)

## 2021-08-20 PROCEDURE — 74150 CT ABDOMEN W/O CONTRAST: CPT

## 2021-08-20 PROCEDURE — 82565 ASSAY OF CREATININE: CPT

## 2021-08-20 NOTE — PROGRESS NOTES
Pt needs to r/s for contrasted portion of study only, dry scan obtained today prior to IV infiltration. Please note this when pt reschedules. ONLY CONTRAST SERIES NEEDED.

## 2021-08-25 ENCOUNTER — TRANSCRIBE ORDER (OUTPATIENT)
Dept: SCHEDULING | Age: 71
End: 2021-08-25

## 2021-08-25 DIAGNOSIS — D41.02 NEOPLASM OF UNCERTAIN BEHAVIOR OF LEFT KIDNEY: Primary | ICD-10-CM

## 2021-09-01 ENCOUNTER — HOSPITAL ENCOUNTER (OUTPATIENT)
Dept: MRI IMAGING | Age: 71
Discharge: HOME | End: 2021-09-01
Attending: UROLOGY
Payer: MEDICARE

## 2021-09-01 VITALS — WEIGHT: 205 LBS | BODY MASS INDEX: 31.17 KG/M2

## 2021-09-01 DIAGNOSIS — D41.02 NEOPLASM OF UNCERTAIN BEHAVIOR OF LEFT KIDNEY: ICD-10-CM

## 2021-09-01 PROCEDURE — 74183 MRI ABD W/O CNTR FLWD CNTR: CPT

## 2021-09-01 PROCEDURE — A9575 INJ GADOTERATE MEGLUMI 0.1ML: HCPCS | Performed by: UROLOGY

## 2021-09-01 PROCEDURE — 74011250636 HC RX REV CODE- 250/636: Performed by: UROLOGY

## 2021-09-01 RX ADMIN — GADOTERATE MEGLUMINE 20 ML: 376.9 INJECTION INTRAVENOUS at 12:19

## 2022-01-10 ENCOUNTER — TRANSCRIBE ORDER (OUTPATIENT)
Dept: SCHEDULING | Age: 72
End: 2022-01-10

## 2022-01-10 DIAGNOSIS — N28.89 RENAL MASS: Primary | ICD-10-CM

## 2022-01-20 ENCOUNTER — HOSPITAL ENCOUNTER (OUTPATIENT)
Dept: CT IMAGING | Age: 72
Discharge: HOME | End: 2022-01-20
Payer: MEDICARE

## 2022-01-20 DIAGNOSIS — N28.89 RENAL MASS: ICD-10-CM

## 2022-01-20 LAB — CREAT UR-MCNC: 0.8 MG/DL (ref 0.6–1.3)

## 2022-01-20 PROCEDURE — 74011000636 HC RX REV CODE- 636: Performed by: REGISTERED NURSE

## 2022-01-20 PROCEDURE — 82565 ASSAY OF CREATININE: CPT

## 2022-01-20 PROCEDURE — 74178 CT ABD&PLV WO CNTR FLWD CNTR: CPT

## 2022-01-20 RX ADMIN — IOPAMIDOL 100 ML: 612 INJECTION, SOLUTION INTRAVENOUS at 13:14

## 2022-02-10 ENCOUNTER — HOSPITAL ENCOUNTER (OUTPATIENT)
Dept: LAB | Age: 72
Discharge: HOME | End: 2022-02-10
Payer: MEDICARE

## 2022-02-10 LAB
ERYTHROCYTE [SEDIMENTATION RATE] IN BLOOD: 33 MM/HR (ref 0–30)
T4 FREE SERPL-MCNC: 0.9 NG/DL (ref 0.7–1.5)
TSH SERPL DL<=0.05 MIU/L-ACNC: 1.49 UIU/ML (ref 0.36–3.74)
VIT B12 SERPL-MCNC: 425 PG/ML (ref 211–911)

## 2022-02-10 PROCEDURE — 86225 DNA ANTIBODY NATIVE: CPT

## 2022-02-10 PROCEDURE — 82607 VITAMIN B-12: CPT

## 2022-02-10 PROCEDURE — 84443 ASSAY THYROID STIM HORMONE: CPT

## 2022-02-10 PROCEDURE — 84439 ASSAY OF FREE THYROXINE: CPT

## 2022-02-10 PROCEDURE — 85652 RBC SED RATE AUTOMATED: CPT

## 2022-02-10 PROCEDURE — 82784 ASSAY IGA/IGD/IGG/IGM EACH: CPT

## 2022-02-10 PROCEDURE — 36415 COLL VENOUS BLD VENIPUNCTURE: CPT

## 2022-02-11 LAB
CENTROMERE B AB SER-ACNC: <0.2 AI (ref 0–0.9)
CHROMATIN AB SERPL-ACNC: <0.2 AI (ref 0–0.9)
DSDNA AB SER-ACNC: 2 IU/ML (ref 0–9)
ENA JO1 AB SER-ACNC: <0.2 AI (ref 0–0.9)
ENA RNP AB SER-ACNC: 0.5 AI (ref 0–0.9)
ENA SCL70 AB SER-ACNC: <0.2 AI (ref 0–0.9)
ENA SM AB SER-ACNC: <0.2 AI (ref 0–0.9)
ENA SS-A AB SER-ACNC: <0.2 AI (ref 0–0.9)
ENA SS-B AB SER-ACNC: 0.3 AI (ref 0–0.9)
SEE BELOW, 164869: NORMAL

## 2022-02-15 LAB
IGA SERPL-MCNC: 118 MG/DL (ref 64–422)
IGG SERPL-MCNC: 774 MG/DL (ref 586–1602)
IGM SERPL-MCNC: 27 MG/DL (ref 26–217)
PROT PATTERN SERPL IFE-IMP: NORMAL

## 2022-02-16 LAB
FAX TO INFO,FAXT: NORMAL
FAX TO NUMBER,FAXN: NORMAL

## 2022-03-20 PROBLEM — H26.9 CATARACT: Status: ACTIVE | Noted: 2018-03-21

## 2022-04-06 ENCOUNTER — HOSPITAL ENCOUNTER (EMERGENCY)
Age: 72
Discharge: HOME | End: 2022-04-06
Attending: STUDENT IN AN ORGANIZED HEALTH CARE EDUCATION/TRAINING PROGRAM
Payer: MEDICARE

## 2022-04-06 ENCOUNTER — APPOINTMENT (OUTPATIENT)
Dept: GENERAL RADIOLOGY | Age: 72
End: 2022-04-06
Attending: PHYSICIAN ASSISTANT
Payer: MEDICARE

## 2022-04-06 VITALS
TEMPERATURE: 98.4 F | HEART RATE: 70 BPM | SYSTOLIC BLOOD PRESSURE: 111 MMHG | WEIGHT: 205 LBS | RESPIRATION RATE: 16 BRPM | BODY MASS INDEX: 31.07 KG/M2 | DIASTOLIC BLOOD PRESSURE: 63 MMHG | OXYGEN SATURATION: 99 % | HEIGHT: 68 IN

## 2022-04-06 DIAGNOSIS — S93.401A SPRAIN OF RIGHT ANKLE, UNSPECIFIED LIGAMENT, INITIAL ENCOUNTER: Primary | ICD-10-CM

## 2022-04-06 PROCEDURE — 99283 EMERGENCY DEPT VISIT LOW MDM: CPT

## 2022-04-06 PROCEDURE — 73610 X-RAY EXAM OF ANKLE: CPT

## 2022-04-07 NOTE — ED TRIAGE NOTES
\" I was at [employer] and tripped over the pew twisting and falling causing pain to my right ankle. \" Unable to bear weight due to pain.

## 2022-04-07 NOTE — ED PROVIDER NOTES
EMERGENCY DEPARTMENT HISTORY AND PHYSICAL EXAM    Date: 4/6/2022  Patient Name: Maynor Andrade    History of Presenting Illness     Chief Complaint   Patient presents with    Ankle Pain         History Provided By: Patient    Chief Complaint: right ankle pain       Additional History (Context):   10:23 PM  Maynor Andrade is a 66 y.o. female with PMHX colitis, anemia, bronchitis, arthritis and chronic pain presents to the emergency department C/O right ankle pain after she tripped at [employer] earlier tonight causing her to twist her ankle. Unable to bear weight after the fall. No head injury. Does have a history of peripheral neuropathy    PCP: Beronica Valencia MD    Current Outpatient Medications   Medication Sig Dispense Refill    hyoscyamine SL (LEVSIN/SL) 0.125 mg SL tablet 0.125 mg by SubLINGual route every four (4) hours as needed.  doxepin (SINEquan) 25 mg capsule Take 25 mg by mouth At bedtime.  metoprolol succinate (TOPROL-XL) 50 mg XL tablet TAKE ONE TABLET BY MOUTH ONCE EVERY DAY      promethazine (PHENERGAN) 25 mg tablet Take 25 mg by mouth daily as needed for Nausea.  B.infantis-B.ani-B.long-B.bifi (PROBIOTIC 4X) 10-15 mg TbEC Take 3 Caps by mouth nightly.  LORazepam (ATIVAN) 0.5 mg tablet Take 0.5 mg by mouth every four (4) hours as needed.  cranberry extract 450 mg tab Take 450 mg by mouth daily.  ferrous sulfate 325 mg (65 mg iron) tablet Take 325 mg by mouth Daily (before breakfast).  multivitamin (ONE A DAY) tablet Take 1 Tab by mouth daily. Indications: 3 gummys bid      zolpidem (AMBIEN) 10 mg tablet Take 10 mg by mouth nightly.  furosemide (LASIX) 40 mg tablet Take 40 mg by mouth daily.  DULoxetine (CYMBALTA) 60 mg capsule Take 60 mg by mouth daily. Indications: NEUROPATHIC PAIN      simvastatin (ZOCOR) 20 mg tablet Take 20 mg by mouth daily.  levothyroxine (SYNTHROID) 100 mcg tablet Take 100 mcg by mouth daily.       Cholecalciferol, Vitamin D3, 5,000 unit Tab Take 5,000 Units by mouth nightly.  VITAMIN E ACETATE (VITAMIN E PO) Take 400 Units by mouth daily.  KRILL OIL PO Take 75 mg by mouth daily. Past History     Past Medical History:  Past Medical History:   Diagnosis Date    Adverse effect of anesthesia     versed makes her vomiting for two days    Bronchitis     Chronic pain     arthritis & previous surgeries    Diverticulitis     History of blood transfusion     x2    Hypoglycemia     Hypothyroid     Intestinal malabsorption     Osteoporosis     Other ill-defined conditions(799.89)     bronchitis 2 weeks ago    Other ill-defined conditions(799.89)     collapsed pelvis on left side    Pelvic obliquity     Smoking history     quit in     Status post gastric banding 2010    band over prior gastric bypass / [facility] via [facility]       Past Surgical History:  Past Surgical History:   Procedure Laterality Date    COLONOSCOPY N/A 2016    COLONOSCOPY w/ polypectomies performed by Raymundo Amado MD at [place] [id number] [facility] GASTRIC BYPASS  2003    HX GASTRIC BYPASS  2009    lap band    HX HEENT      tonsillectomy and adenoidectomy    HX HEENT  2018    cataract left    HX HYSTERECTOMY      HX KNEE REPLACEMENT      bilateral knee    HX ORTHOPAEDIC      left hip replacement x 2,    HX SHOULDER REPLACEMENT      left    HX TONSIL AND ADENOIDECTOMY      MN BREAST SURGERY PROCEDURE UNLISTED      3 partial lumpectomies, left and right       Family History:  Family History   Problem Relation Age of Onset    Cancer Other     Diabetes Other     Colon Cancer Mother        Social History:  Social History     Tobacco Use    Smoking status: Former Smoker     Packs/day: 2.00     Years: 8.00     Pack years: 16.00     Quit date: 1969     Years since quittin.7    Smokeless tobacco: Never Used   Substance Use Topics    Alcohol use:  Yes     Alcohol/week: 1.7 standard drinks     Types: 2 Standard drinks or equivalent per week     Comment: social    Drug use: No       Allergies: Allergies   Allergen Reactions    Ciprofibrate Anaphylaxis    Levaquin [Levofloxacin] Anaphylaxis    Sulfa (Sulfonamide Antibiotics) Anaphylaxis and Rash    Codeine Nausea and Vomiting    Versed [Midazolam] Nausea and Vomiting       Review of Systems   Review of Systems   Constitutional: Negative for chills and fever. Respiratory: Negative for shortness of breath. Cardiovascular: Negative for chest pain. Musculoskeletal: Positive for arthralgias (right ankle). Negative for back pain and neck pain. Skin: Negative for rash. Neurological: Negative for weakness and numbness. All other systems reviewed and are negative. Physical Exam     Vitals:    04/06/22 2135   BP: 111/63   Pulse: 70   Resp: 16   Temp: 98.4 °F (36.9 °C)   SpO2: 99%     Physical Exam  Vitals and nursing note reviewed. Constitutional:       Appearance: She is well-developed. HENT:      Head: Normocephalic and atraumatic. Cardiovascular:      Rate and Rhythm: Normal rate and regular rhythm. Heart sounds: Normal heart sounds. No murmur heard. Pulmonary:      Effort: Pulmonary effort is normal. No respiratory distress. Breath sounds: Normal breath sounds. No wheezing or rales. Abdominal:      General: Bowel sounds are normal.      Palpations: Abdomen is soft. Tenderness: There is no abdominal tenderness. Musculoskeletal:      Cervical back: Normal range of motion and neck supple. Right ankle: Tenderness present over the lateral malleolus. No base of 5th metatarsal or proximal fibula tenderness. Normal pulse.       Comments: TTP over the lateral malleolus no pain over the proximal fibula, swelling over the lateral malleolus, DP pulse palpable, PT pulse found with Doppler, brisk cap refill, sensation at baseline   Neurological:      Mental Status: She is alert and oriented to person, place, and time. Psychiatric:         Judgment: Judgment normal.         Diagnostic Study Results     Labs:   No results found for this or any previous visit (from the past 12 hour(s)). Radiologic Studies:   XR ANKLE RT MIN 3 V   Final Result      Soft tissue swelling. No acute fracture        CT Results  (Last 48 hours)    None        CXR Results  (Last 48 hours)    None          Medical Decision Making   I am the first provider for this patient. I reviewed the vital signs, available nursing notes, past medical history, past surgical history, family history and social history. Vital Signs: Reviewed the patient's vital signs. Pulse Oximetry Analysis: 99% on RA     Records Reviewed: Nursing Notes and Old Medical Records    Procedures:  Procedures    ED Course:   10:23 PM Initial assessment performed. The patients presenting problems have been discussed, and they are in agreement with the care plan formulated and outlined with them. I have encouraged them to ask questions as they arise throughout their visit. Discussion:  Pt presents with right ankle pain after a mechanical fall earlier tonight. No head injury. Patient has a long history of osteoarthritis and chronic joint problems followed by Dr. Neeta Devlin. X-ray shows no acute process. She is neurovascularly intact distally. Will place in air splint. She does have a walker at home which I recommended using to avoid weightbearing for a week. Advised ice rest.  She does have 800 mg ibuprofen at home but does not want to take any at this time. She also has Voltaren gel that she will try when she gets home. . Strict return precautions given, pt offering no questions or complaints. Diagnosis and Disposition     DISCHARGE NOTE:  Jeff Valle  results have been reviewed with her. She has been counseled regarding her diagnosis, treatment, and plan.   She verbally conveys understanding and agreement of the signs, symptoms, diagnosis, treatment and prognosis and additionally agrees to follow up as discussed. She also agrees with the care-plan and conveys that all of her questions have been answered. I have also provided discharge instructions for her that include: educational information regarding their diagnosis and treatment, and list of reasons why they would want to return to the ED prior to their follow-up appointment, should her condition change. She has been provided with education for proper emergency department utilization. CLINICAL IMPRESSION:    1. Sprain of right ankle, unspecified ligament, initial encounter        PLAN:  1. D/C Home  2. Current Discharge Medication List        3. Follow-up Information     Follow up With Specialties Details Why Contact Mushtaq Bhandari MD Orthopedic Surgery Schedule an appointment as soon as possible for a visit   [facility] [phone]      [facility] EMERGENCY DEPT Emergency Medicine  If symptoms worsen [address] [phone]                 Please note that this dictation was completed with [employer], the computer voice recognition software. Quite often unanticipated grammatical, syntax, homophones, and other interpretive errors are inadvertently transcribed by the computer software. Please disregard these errors. Please excuse any errors that have escaped final proofreading.

## 2022-04-25 ENCOUNTER — TRANSCRIBE ORDER (OUTPATIENT)
Dept: SCHEDULING | Age: 72
End: 2022-04-25

## 2022-04-25 DIAGNOSIS — M48.00 SPINAL STENOSIS: Primary | ICD-10-CM

## 2022-06-06 ENCOUNTER — HOSPITAL ENCOUNTER (OUTPATIENT)
Dept: MRI IMAGING | Age: 72
Discharge: HOME | End: 2022-06-06
Payer: MEDICARE

## 2022-06-06 DIAGNOSIS — M48.00 SPINAL STENOSIS: ICD-10-CM

## 2022-06-06 PROCEDURE — 72141 MRI NECK SPINE W/O DYE: CPT

## 2022-08-31 ENCOUNTER — TRANSCRIBE ORDER (OUTPATIENT)
Dept: SCHEDULING | Age: 72
End: 2022-08-31

## 2022-08-31 DIAGNOSIS — C64.9 MALIGNANT NEOPLASM OF KIDNEY EXCLUDING RENAL PELVIS, UNSPECIFIED LATERALITY (HCC): Primary | ICD-10-CM

## 2022-09-07 ENCOUNTER — TRANSCRIBE ORDER (OUTPATIENT)
Dept: SCHEDULING | Age: 72
End: 2022-09-07

## 2022-09-07 DIAGNOSIS — C64.9 MALIGNANT NEOPLASM OF KIDNEY, UNSPECIFIED LATERALITY (HCC): Primary | ICD-10-CM

## 2022-09-07 DIAGNOSIS — C64.1 MALIGNANT NEOPLASM OF RIGHT KIDNEY, EXCEPT RENAL PELVIS (HCC): ICD-10-CM

## 2022-09-28 ENCOUNTER — TRANSCRIBE ORDER (OUTPATIENT)
Dept: SCHEDULING | Age: 72
End: 2022-09-28

## 2022-09-28 DIAGNOSIS — R74.01 ELEVATED TRANSAMINASE LEVEL: Primary | ICD-10-CM

## 2022-10-04 ENCOUNTER — HOSPITAL ENCOUNTER (OUTPATIENT)
Dept: CT IMAGING | Age: 72
Discharge: HOME | End: 2022-10-04
Payer: MEDICARE

## 2022-10-04 ENCOUNTER — HOSPITAL ENCOUNTER (OUTPATIENT)
Dept: ULTRASOUND IMAGING | Age: 72
Discharge: HOME | End: 2022-10-04
Attending: INTERNAL MEDICINE
Payer: MEDICARE

## 2022-10-04 DIAGNOSIS — R74.01 ELEVATED TRANSAMINASE LEVEL: ICD-10-CM

## 2022-10-04 DIAGNOSIS — C64.1 MALIGNANT NEOPLASM OF RIGHT KIDNEY, EXCEPT RENAL PELVIS (HCC): ICD-10-CM

## 2022-10-04 DIAGNOSIS — C64.9 MALIGNANT NEOPLASM OF KIDNEY, UNSPECIFIED LATERALITY (HCC): ICD-10-CM

## 2022-10-04 PROCEDURE — 74011000636 HC RX REV CODE- 636

## 2022-10-04 PROCEDURE — 74178 CT ABD&PLV WO CNTR FLWD CNTR: CPT

## 2022-10-04 PROCEDURE — 71260 CT THORAX DX C+: CPT

## 2022-10-04 RX ADMIN — IOPAMIDOL 100 ML: 612 INJECTION, SOLUTION INTRAVENOUS at 14:53

## 2022-10-11 ENCOUNTER — HOSPITAL ENCOUNTER (OUTPATIENT)
Dept: ULTRASOUND IMAGING | Age: 72
Discharge: HOME | End: 2022-10-11
Attending: INTERNAL MEDICINE
Payer: MEDICARE

## 2022-10-11 PROCEDURE — 76700 US EXAM ABDOM COMPLETE: CPT

## 2023-10-05 ENCOUNTER — HOSPITAL ENCOUNTER (OUTPATIENT)
Facility: HOSPITAL | Age: 73
Discharge: HOME | End: 2023-10-05
Attending: INTERNAL MEDICINE
Payer: MEDICARE

## 2023-10-05 DIAGNOSIS — D70.9 NEUTROPENIC TYPHLITIS (HCC): ICD-10-CM

## 2023-10-05 DIAGNOSIS — K37 NEUTROPENIC TYPHLITIS (HCC): ICD-10-CM

## 2023-10-05 PROCEDURE — 74177 CT ABD & PELVIS W/CONTRAST: CPT

## 2023-10-05 PROCEDURE — 6360000004 HC RX CONTRAST MEDICATION: Performed by: INTERNAL MEDICINE

## 2023-10-05 RX ADMIN — IOPAMIDOL 100 ML: 612 INJECTION, SOLUTION INTRAVENOUS at 09:04

## 2024-10-02 ENCOUNTER — TRANSCRIBE ORDERS (OUTPATIENT)
Facility: HOSPITAL | Age: 74
End: 2024-10-02

## 2024-10-02 DIAGNOSIS — D70.9 NEUTROPENIA, UNSPECIFIED TYPE (HCC): Primary | ICD-10-CM

## 2024-10-04 ENCOUNTER — HOSPITAL ENCOUNTER (OUTPATIENT)
Facility: HOSPITAL | Age: 74
Discharge: HOME | End: 2024-10-07
Attending: INTERNAL MEDICINE
Payer: MEDICARE

## 2024-10-04 DIAGNOSIS — D70.9 NEUTROPENIC TYPHLITIS (HCC): ICD-10-CM

## 2024-10-04 DIAGNOSIS — K37 NEUTROPENIC TYPHLITIS (HCC): ICD-10-CM

## 2024-10-04 PROCEDURE — 74176 CT ABD & PELVIS W/O CONTRAST: CPT

## 2024-12-06 ENCOUNTER — OFFICE VISIT (OUTPATIENT)
Age: 74
End: 2024-12-06
Payer: MEDICARE

## 2024-12-06 VITALS
SYSTOLIC BLOOD PRESSURE: 122 MMHG | DIASTOLIC BLOOD PRESSURE: 53 MMHG | TEMPERATURE: 97.9 F | WEIGHT: 196.5 LBS | OXYGEN SATURATION: 100 % | HEIGHT: 68 IN | BODY MASS INDEX: 29.78 KG/M2 | HEART RATE: 83 BPM

## 2024-12-06 DIAGNOSIS — R13.10 DYSPHAGIA, UNSPECIFIED TYPE: Primary | ICD-10-CM

## 2024-12-06 PROCEDURE — G8427 DOCREV CUR MEDS BY ELIG CLIN: HCPCS | Performed by: SPECIALIST

## 2024-12-06 PROCEDURE — 1123F ACP DISCUSS/DSCN MKR DOCD: CPT | Performed by: SPECIALIST

## 2024-12-06 PROCEDURE — 99214 OFFICE O/P EST MOD 30 MIN: CPT | Performed by: SPECIALIST

## 2024-12-06 PROCEDURE — 1090F PRES/ABSN URINE INCON ASSESS: CPT | Performed by: SPECIALIST

## 2024-12-06 PROCEDURE — G8419 CALC BMI OUT NRM PARAM NOF/U: HCPCS | Performed by: SPECIALIST

## 2024-12-06 PROCEDURE — G8484 FLU IMMUNIZE NO ADMIN: HCPCS | Performed by: SPECIALIST

## 2024-12-06 PROCEDURE — 1036F TOBACCO NON-USER: CPT | Performed by: SPECIALIST

## 2024-12-06 PROCEDURE — G8400 PT W/DXA NO RESULTS DOC: HCPCS | Performed by: SPECIALIST

## 2024-12-06 RX ORDER — DOXEPIN HYDROCHLORIDE 25 MG/1
1 CAPSULE ORAL
COMMUNITY

## 2024-12-06 RX ORDER — BUMETANIDE 1 MG/1
1 TABLET ORAL DAILY
COMMUNITY
Start: 2024-10-01

## 2024-12-06 RX ORDER — IPRATROPIUM BROMIDE 42 UG/1
SPRAY, METERED NASAL
COMMUNITY
Start: 2023-11-28

## 2024-12-06 RX ORDER — BUSPIRONE HYDROCHLORIDE 10 MG/1
10 TABLET ORAL 2 TIMES DAILY
COMMUNITY

## 2024-12-06 RX ORDER — METOPROLOL SUCCINATE 50 MG/1
TABLET, EXTENDED RELEASE ORAL
COMMUNITY

## 2024-12-06 RX ORDER — HYDROCHLOROTHIAZIDE 25 MG/1
25 TABLET ORAL DAILY
COMMUNITY

## 2024-12-06 RX ORDER — LEVOTHYROXINE SODIUM 50 UG/1
50 TABLET ORAL
COMMUNITY

## 2024-12-06 RX ORDER — LACTOBACILLUS RHAMNOSUS GG 10B CELL
1 CAPSULE ORAL NIGHTLY
COMMUNITY

## 2024-12-06 RX ORDER — CLOPIDOGREL BISULFATE 75 MG/1
TABLET ORAL
COMMUNITY
Start: 2024-06-08 | End: 2025-06-09

## 2024-12-06 RX ORDER — DENOSUMAB 60 MG/ML
60 INJECTION SUBCUTANEOUS
COMMUNITY

## 2024-12-06 RX ORDER — LISINOPRIL 20 MG/1
1 TABLET ORAL DAILY
COMMUNITY
Start: 2024-06-27

## 2024-12-06 RX ORDER — VITAMIN E 268 MG
400 CAPSULE ORAL DAILY
COMMUNITY

## 2024-12-06 RX ORDER — METOPROLOL TARTRATE 100 MG/1
100 TABLET ORAL
COMMUNITY

## 2024-12-06 RX ORDER — CARVEDILOL 25 MG/1
25 TABLET ORAL 2 TIMES DAILY WITH MEALS
COMMUNITY

## 2024-12-06 RX ORDER — FERROUS SULFATE 325(65) MG
325 TABLET ORAL
COMMUNITY

## 2024-12-06 RX ORDER — FUROSEMIDE 40 MG/1
40 TABLET ORAL DAILY
COMMUNITY

## 2024-12-06 RX ORDER — MESALAMINE 1.2 G/1
3600 TABLET, DELAYED RELEASE ORAL DAILY
COMMUNITY

## 2024-12-06 RX ORDER — DULOXETIN HYDROCHLORIDE 60 MG/1
60 CAPSULE, DELAYED RELEASE ORAL DAILY
COMMUNITY

## 2024-12-06 RX ORDER — CLOBETASOL PROPIONATE 0.5 MG/G
CREAM TOPICAL
COMMUNITY
Start: 2024-03-20

## 2024-12-06 RX ORDER — KRILL/OM-3/DHA/EPA/PHOSPHO/AST 500-110 MG
500 CAPSULE ORAL DAILY
COMMUNITY

## 2024-12-06 RX ORDER — SPIRONOLACTONE 25 MG/1
1 TABLET ORAL
COMMUNITY

## 2024-12-06 RX ORDER — ZINC GLUCONATE 50 MG
50 TABLET ORAL DAILY
COMMUNITY

## 2024-12-06 RX ORDER — GABAPENTIN 100 MG/1
100 CAPSULE ORAL NIGHTLY
COMMUNITY

## 2024-12-06 RX ORDER — SIMVASTATIN 20 MG
20 TABLET ORAL DAILY
COMMUNITY

## 2024-12-06 RX ORDER — ZOLPIDEM TARTRATE 10 MG/1
1 TABLET ORAL
COMMUNITY

## 2024-12-06 RX ORDER — IPRATROPIUM BROMIDE 21 UG/1
2 SPRAY, METERED NASAL EVERY 12 HOURS PRN
COMMUNITY

## 2024-12-06 RX ORDER — ASPIRIN 81 MG/1
81 TABLET, CHEWABLE ORAL DAILY
COMMUNITY

## 2024-12-06 RX ORDER — POTASSIUM CHLORIDE 1500 MG/1
TABLET, EXTENDED RELEASE ORAL
COMMUNITY
Start: 2024-09-11

## 2024-12-06 RX ORDER — DULOXETIN HYDROCHLORIDE 20 MG/1
20 CAPSULE, DELAYED RELEASE ORAL DAILY
COMMUNITY

## 2024-12-06 RX ORDER — VALACYCLOVIR HYDROCHLORIDE 500 MG/1
TABLET, FILM COATED ORAL
COMMUNITY

## 2024-12-06 RX ORDER — MULTIVITAMIN,THERAPEUTIC
1 TABLET ORAL DAILY
COMMUNITY

## 2024-12-06 RX ORDER — LISINOPRIL 40 MG/1
20 TABLET ORAL DAILY
COMMUNITY

## 2024-12-06 RX ORDER — MOMETASONE FUROATE 1 MG/G
CREAM TOPICAL
COMMUNITY
Start: 2024-08-28

## 2024-12-06 RX ORDER — LORATADINE 10 MG/1
1 TABLET ORAL
COMMUNITY

## 2024-12-06 RX ORDER — POLYETHYLENE GLYCOL 3350 17 G/17G
17 POWDER, FOR SOLUTION ORAL DAILY
COMMUNITY

## 2024-12-06 RX ORDER — LEVOTHYROXINE SODIUM 100 UG/1
100 TABLET ORAL DAILY
COMMUNITY

## 2024-12-06 RX ORDER — ZOLEDRONIC ACID 0.05 MG/ML
5 INJECTION, SOLUTION INTRAVENOUS
COMMUNITY
Start: 2025-06-02

## 2024-12-06 RX ORDER — HYOSCYAMINE SULFATE 0.125 MG
TABLET,DISINTEGRATING ORAL
COMMUNITY
Start: 2024-10-01

## 2024-12-06 RX ORDER — FERROUS SULFATE 325(65) MG
325 TABLET, DELAYED RELEASE (ENTERIC COATED) ORAL DAILY
COMMUNITY

## 2024-12-06 RX ORDER — PSYLLIUM HUSK 0.4 G
5000 CAPSULE ORAL DAILY
COMMUNITY

## 2024-12-06 RX ORDER — CHLORAL HYDRATE 500 MG
2 CAPSULE ORAL
COMMUNITY

## 2024-12-06 RX ORDER — MIRABEGRON 50 MG/1
TABLET, FILM COATED, EXTENDED RELEASE ORAL
COMMUNITY

## 2024-12-06 RX ORDER — AMITRIPTYLINE HYDROCHLORIDE 10 MG/1
3 TABLET ORAL
COMMUNITY

## 2024-12-06 RX ORDER — METOPROLOL SUCCINATE 100 MG/1
50 TABLET, EXTENDED RELEASE ORAL DAILY
COMMUNITY

## 2024-12-06 RX ORDER — LORAZEPAM 0.5 MG/1
0.5 TABLET ORAL 3 TIMES DAILY PRN
COMMUNITY

## 2024-12-06 RX ORDER — TRAZODONE HYDROCHLORIDE 50 MG/1
1 TABLET, FILM COATED ORAL NIGHTLY
COMMUNITY
Start: 2024-09-24

## 2024-12-06 RX ORDER — SACCHAROMYCES BOULARDII 250 MG
250 CAPSULE ORAL 2 TIMES DAILY
COMMUNITY

## 2024-12-11 ENCOUNTER — HOSPITAL ENCOUNTER (OUTPATIENT)
Facility: HOSPITAL | Age: 74
Discharge: HOME | End: 2024-12-14
Payer: MEDICARE

## 2024-12-11 ENCOUNTER — HOSPITAL ENCOUNTER (OUTPATIENT)
Facility: HOSPITAL | Age: 74
Setting detail: OUTPATIENT SURGERY
Discharge: HOME | End: 2024-12-14
Payer: MEDICARE

## 2024-12-11 VITALS
RESPIRATION RATE: 18 BRPM | TEMPERATURE: 97.8 F | HEART RATE: 71 BPM | HEIGHT: 68 IN | BODY MASS INDEX: 29.77 KG/M2 | SYSTOLIC BLOOD PRESSURE: 102 MMHG | WEIGHT: 196.4 LBS | DIASTOLIC BLOOD PRESSURE: 71 MMHG

## 2024-12-11 DIAGNOSIS — E66.01 MORBID OBESITY: ICD-10-CM

## 2024-12-11 PROCEDURE — S2083 ADJUSTMENT GASTRIC BAND: HCPCS | Performed by: SPECIALIST

## 2024-12-11 PROCEDURE — 74220 X-RAY XM ESOPHAGUS 1CNTRST: CPT

## 2024-12-11 PROCEDURE — 2500000003 HC RX 250 WO HCPCS: Performed by: SPECIALIST

## 2024-12-11 PROCEDURE — 6360000002 HC RX W HCPCS: Performed by: SPECIALIST

## 2024-12-11 PROCEDURE — 43999 UNLISTED PROCEDURE STOMACH: CPT

## 2024-12-11 PROCEDURE — 99213 OFFICE O/P EST LOW 20 MIN: CPT | Performed by: SPECIALIST

## 2024-12-11 PROCEDURE — 43999 UNLISTED PROCEDURE STOMACH: CPT | Performed by: SPECIALIST

## 2024-12-11 RX ORDER — LIDOCAINE HYDROCHLORIDE 10 MG/ML
1 INJECTION, SOLUTION EPIDURAL; INFILTRATION; INTRACAUDAL; PERINEURAL ONCE
Status: COMPLETED | OUTPATIENT
Start: 2024-12-11 | End: 2024-12-11

## 2024-12-11 RX ADMIN — BARIUM SULFATE 100 ML: 960 POWDER, FOR SUSPENSION ORAL at 15:10

## 2024-12-11 RX ADMIN — LIDOCAINE HYDROCHLORIDE 1 ML: 10 INJECTION, SOLUTION EPIDURAL; INFILTRATION; INTRACAUDAL; PERINEURAL at 15:12

## 2024-12-11 NOTE — PROGRESS NOTES
[facility] UGI FOCUS NOTE      Date:  12/11/2024  Time:  3:08 PM    Patient:  Dara Rodrigues  Procedure:  UGI      Patient Questions  Lap Band Adjustment Patient Questionnaire:  If female, are you pregnant? []Yes     [x]No  Tolerates thick liquids:  [x]Well   []1     []2     []3     []4     []5     []Poorly  Tolerates red meat:  []Well   []1     []2     []3     []4     []5     [x] Poorly  Tolerates chicken:  []Well   []1     []2     [x]3     []4     []5     []Poorly  Tolerates fish:   []Well   []1     [x]2     []3     []4     []5     []Poorly  Hunger is:   [x]Well Controlled     []1     []2     []3     []4     []5      [] Poorly Controlled  Nightime regurgitation:  [x]Never     []1     []2     []3     []4     []5     []Frequently    Lap Band Info:  Band Type  []Realize     []Realize-C     []AP     []VG     []10cm     []Unknown  []Other      Comments:        Chanel Buckner RN

## 2024-12-11 NOTE — PROCEDURES
Lap Band Encounter (fluroscopy clinic)    Dara Rodrigues is gastric banding patient who had her procedure on  .  her weight today is 89.1 kg (196 lb 6.4 oz), which correlates to  % EBW loss.  she is here today for  .  she notes the following issues related to the banding procedure; - here with symptoms of an overly too tight band.      Surgery related complications; Brengman band over Raoul Nnamdi VBG    /71   Pulse 71   Temp 97.8 °F (36.6 °C)   Resp 18   Ht 1.727 m (5' 8\")   Wt 89.1 kg (196 lb 6.4 oz)    L/min   BMI 29.86 kg/m²     Past Medical History:   Diagnosis Date    Adverse effect of anesthesia     versed makes her vomiting for two days    Bronchitis     Chronic pain     arthritis & previous surgeries    Diverticulitis     History of blood transfusion     x2    Hypoglycemia     Hypothyroid     Intestinal malabsorption     Osteoporosis     Other ill-defined conditions(799.89)     collapsed pelvis on left side    Other ill-defined conditions(799.89)     bronchitis 2 weeks ago    Pelvic obliquity     Smoking history     quit in 1969    Status post gastric banding 03/2010    band over prior gastric bypass / [place] via Joseph     Past Surgical History:   Procedure Laterality Date    BREAST SURGERY      3 partial lumpectomies, left and right    CHOLECYSTECTOMY      COLONOSCOPY N/A 12/16/2016    COLONOSCOPY w/ polypectomies performed by MIREYA Graham MD at [facility] ENDOSCOPY    CT BONE MARROW BIOPSY  5/28/2021    CT BONE MARROW BIOPSY 5/28/2021    GASTRIC BYPASS SURGERY  2009    lap band    GASTRIC BYPASS SURGERY  2003    HEENT  2018    cataract left    HEENT      tonsillectomy and adenoidectomy    HYSTERECTOMY (CERVIX STATUS UNKNOWN)      ORTHOPEDIC SURGERY      left hip replacement x 2,    SHOULDER ARTHROPLASTY      left    TONSILLECTOMY AND ADENOIDECTOMY      TOTAL KNEE ARTHROPLASTY      bilateral knee     Current Outpatient Medications   Medication Sig Dispense Refill    amitriptyline

## 2024-12-18 NOTE — PROGRESS NOTES
noted            Labs:     CMP:   Lab Results   Component Value Date/Time     04/07/2021 01:41 PM    K 4.5 04/07/2021 01:41 PM     04/07/2021 01:41 PM    CO2 28 04/07/2021 01:41 PM    BUN 20 04/07/2021 01:41 PM    CREATININE 0.75 04/07/2021 01:41 PM    GLUCOSE 113 04/07/2021 01:41 PM    CALCIUM 8.5 04/07/2021 01:41 PM    BILITOT 0.5 04/07/2021 01:41 PM    AST 51 04/07/2021 01:41 PM    ALT 84 04/07/2021 01:41 PM      CBC:   Lab Results   Component Value Date/Time    WBC 2.8 04/07/2021 01:41 PM    RBC 3.59 04/07/2021 01:41 PM    HGB 11.9 04/07/2021 01:41 PM    HCT 38.1 04/07/2021 01:41 PM    .1 04/07/2021 01:41 PM    MCH 33.1 04/07/2021 01:41 PM    MCHC 31.2 04/07/2021 01:41 PM    RDW 13.2 04/07/2021 01:41 PM     04/07/2021 01:41 PM    MPV 10.0 04/07/2021 01:41 PM      VITAMIN D:   Lab Results   Component Value Date/Time    VITD25 71.2 04/07/2021 01:43 PM     VITAMIN B12 AND FOLATE:   Lab Results   Component Value Date/Time    [id number] 425 02/10/2022 09:30 AM    FOLATE >20.0 04/07/2021 01:41 PM     IRON:   Lab Results   Component Value Date/Time    IRON 104 04/07/2021 01:41 PM     FERRITIN:   Lab Results   Component Value Date/Time    FERRITIN 593 04/07/2021 01:41 PM     VITAMIN B1: No results found for: \"[id number]\"            Assessment and Plan:   The patient is status post lap band procedure with likely a band that is too tight given her age.  At this juncture recommend to her that we proceed with an upper GI study to assess her for a band slip.  She understands that with age patients typically need to have some fluid removed from their bands.  She will come in next week for that upper GI study.    Patient to complete labs before next visit.  Lab slip given today.  I have discussed this plan with patient and they verbalized understanding    45 minutes spent with patient    Follow up in 1 weeks for UGI study

## 2025-03-20 ENCOUNTER — TRANSCRIBE ORDERS (OUTPATIENT)
Facility: HOSPITAL | Age: 75
End: 2025-03-20

## 2025-03-20 DIAGNOSIS — R63.4 ABNORMAL WEIGHT LOSS: Primary | ICD-10-CM

## 2025-03-28 ENCOUNTER — HOSPITAL ENCOUNTER (OUTPATIENT)
Facility: HOSPITAL | Age: 75
Discharge: HOME | End: 2025-03-31
Attending: INTERNAL MEDICINE
Payer: MEDICARE

## 2025-03-28 DIAGNOSIS — R79.9 ABNORMAL BLOOD CHEMISTRY: ICD-10-CM

## 2025-03-28 DIAGNOSIS — R93.89 ABNORMAL FINDING ON IMAGING: ICD-10-CM

## 2025-03-28 PROCEDURE — 74176 CT ABD & PELVIS W/O CONTRAST: CPT

## 2025-08-29 ENCOUNTER — TRANSCRIBE ORDERS (OUTPATIENT)
Facility: HOSPITAL | Age: 75
End: 2025-08-29

## 2025-08-29 ENCOUNTER — HOSPITAL ENCOUNTER (OUTPATIENT)
Facility: HOSPITAL | Age: 75
Discharge: HOME | End: 2025-09-01
Payer: MEDICARE

## 2025-08-29 DIAGNOSIS — M25.512 LEFT SHOULDER PAIN, UNSPECIFIED CHRONICITY: ICD-10-CM

## 2025-08-29 DIAGNOSIS — Z01.812 PRE-OPERATIVE LABORATORY EXAMINATION: Primary | ICD-10-CM

## 2025-08-29 DIAGNOSIS — Z01.812 PRE-OPERATIVE LABORATORY EXAMINATION: ICD-10-CM

## 2025-08-29 LAB
ALBUMIN SERPL-MCNC: 3.3 G/DL (ref 3.4–5)
ALBUMIN/GLOB SERPL: 1.3 (ref 0.8–1.7)
ALP SERPL-CCNC: 73 U/L (ref 45–117)
ALT SERPL-CCNC: 11 U/L (ref 10–35)
ANION GAP SERPL CALC-SCNC: 9 MMOL/L (ref 3–18)
APPEARANCE UR: ABNORMAL
APTT PPP: 24 SEC (ref 21.7–34.2)
AST SERPL-CCNC: 18 U/L (ref 10–38)
BACTERIA URNS QL MICRO: ABNORMAL /HPF
BASOPHILS # BLD: 0.03 K/UL (ref 0–0.1)
BASOPHILS NFR BLD: 0.6 % (ref 0–2)
BILIRUB SERPL-MCNC: 0.3 MG/DL (ref 0.2–1)
BILIRUB UR QL: NEGATIVE
BUN SERPL-MCNC: 27 MG/DL (ref 6–23)
BUN/CREAT SERPL: 23 (ref 12–20)
CALCIUM SERPL-MCNC: 9.9 MG/DL (ref 8.5–10.1)
CHLORIDE SERPL-SCNC: 107 MMOL/L (ref 98–107)
CO2 SERPL-SCNC: 26 MMOL/L (ref 21–32)
COLOR UR: YELLOW
CREAT SERPL-MCNC: 1.15 MG/DL (ref 0.6–1.3)
DIFFERENTIAL METHOD BLD: ABNORMAL
EOSINOPHIL # BLD: 0.43 K/UL (ref 0–0.4)
EOSINOPHIL NFR BLD: 7.9 % (ref 0–5)
EPITH CASTS URNS QL MICRO: ABNORMAL /LPF (ref 0–5)
ERYTHROCYTE [DISTWIDTH] IN BLOOD BY AUTOMATED COUNT: 13.1 % (ref 11.6–14.5)
EST. AVERAGE GLUCOSE BLD GHB EST-MCNC: 113 MG/DL
GLOBULIN SER CALC-MCNC: 2.7 G/DL (ref 2–4)
GLUCOSE SERPL-MCNC: 77 MG/DL (ref 74–108)
GLUCOSE UR STRIP.AUTO-MCNC: NEGATIVE MG/DL
HBA1C MFR BLD: 5.6 % (ref 4.2–5.6)
HCT VFR BLD AUTO: 35.1 % (ref 35–45)
HGB BLD-MCNC: 11.5 G/DL (ref 12–16)
HGB UR QL STRIP: NEGATIVE
IMM GRANULOCYTES # BLD AUTO: 0.01 K/UL (ref 0–0.04)
IMM GRANULOCYTES NFR BLD AUTO: 0.2 % (ref 0–0.5)
INR PPP: 0.9 (ref 0.9–1.2)
KETONES UR QL STRIP.AUTO: ABNORMAL MG/DL
LEUKOCYTE ESTERASE UR QL STRIP.AUTO: ABNORMAL
LYMPHOCYTES # BLD: 1.48 K/UL (ref 0.9–3.3)
LYMPHOCYTES NFR BLD: 27.2 % (ref 21–52)
MCH RBC QN AUTO: 33.6 PG (ref 24–34)
MCHC RBC AUTO-ENTMCNC: 32.8 G/DL (ref 31–37)
MCV RBC AUTO: 102.6 FL (ref 78–100)
MONOCYTES # BLD: 0.57 K/UL (ref 0.05–1.2)
MONOCYTES NFR BLD: 10.5 % (ref 3–10)
NEUTS SEG # BLD: 2.93 K/UL (ref 1.8–8)
NEUTS SEG NFR BLD: 53.6 % (ref 40–73)
NITRITE UR QL STRIP.AUTO: NEGATIVE
NRBC # BLD: 0 K/UL (ref 0–0.01)
NRBC BLD-RTO: 0 PER 100 WBC
PH UR STRIP: 5.5 (ref 5–8)
PLATELET # BLD AUTO: 236 K/UL (ref 135–420)
PMV BLD AUTO: 9.9 FL (ref 9.2–11.8)
POTASSIUM SERPL-SCNC: 5 MMOL/L (ref 3.5–5.5)
PROT SERPL-MCNC: 6 G/DL (ref 6.4–8.2)
PROT UR STRIP-MCNC: NEGATIVE MG/DL
PROTHROMBIN TIME: 12.5 SEC (ref 12–15.1)
RBC # BLD AUTO: 3.42 M/UL (ref 4.2–5.3)
RBC #/AREA URNS HPF: NEGATIVE /HPF (ref 0–5)
SODIUM SERPL-SCNC: 142 MMOL/L (ref 136–145)
SP GR UR REFRACTOMETRY: 1.02 (ref 1–1.03)
UROBILINOGEN UR QL STRIP.AUTO: 1 EU/DL (ref 0.2–1)
WBC # BLD AUTO: 5.5 K/UL (ref 4.6–13.2)
WBC URNS QL MICRO: ABNORMAL /HPF (ref 0–5)

## 2025-08-29 PROCEDURE — 36415 COLL VENOUS BLD VENIPUNCTURE: CPT

## 2025-08-29 PROCEDURE — 85730 THROMBOPLASTIN TIME PARTIAL: CPT

## 2025-08-29 PROCEDURE — 80053 COMPREHEN METABOLIC PANEL: CPT

## 2025-08-29 PROCEDURE — 85610 PROTHROMBIN TIME: CPT

## 2025-08-29 PROCEDURE — 81001 URINALYSIS AUTO W/SCOPE: CPT

## 2025-08-29 PROCEDURE — 85025 COMPLETE CBC W/AUTO DIFF WBC: CPT

## 2025-08-29 PROCEDURE — 83036 HEMOGLOBIN GLYCOSYLATED A1C: CPT

## 2025-08-31 LAB
BACTERIA SPEC CULT: NORMAL
BACTERIA SPEC CULT: NORMAL
SERVICE CMNT-IMP: NORMAL

## (undated) DEVICE — TIP FLO PHACO 30DEG CVD INF SL 20GA LAM

## (undated) DEVICE — TOWEL SURG W16XL26IN BLU NONFENESTRATED DLX ST 2 PER PK

## (undated) DEVICE — DEVON™ KNEE AND BODY STRAP 60" X 3" (1.5 M X 7.6 CM): Brand: DEVON

## (undated) DEVICE — (D)STRIP SKN CLSR 0.5X4IN WHT --

## (undated) DEVICE — Device

## (undated) DEVICE — SATINSLIT® KNIFE 3.0MM ANGLED: Brand: SATINSLIT®

## (undated) DEVICE — SOLUTION IV STRL H2O 500 ML AQUALITE POUR BTL

## (undated) DEVICE — MAJ-1414 SINGLE USE ADPATER BIOPSY VALV: Brand: SINGLE USE ADAPTOR BIOPSY VALVE